# Patient Record
Sex: FEMALE | Race: ASIAN | NOT HISPANIC OR LATINO | URBAN - METROPOLITAN AREA
[De-identification: names, ages, dates, MRNs, and addresses within clinical notes are randomized per-mention and may not be internally consistent; named-entity substitution may affect disease eponyms.]

---

## 2022-01-02 ENCOUNTER — INPATIENT (INPATIENT)
Facility: HOSPITAL | Age: 87
LOS: 1 days | Discharge: HOME | DRG: 177 | End: 2022-01-04
Attending: STUDENT IN AN ORGANIZED HEALTH CARE EDUCATION/TRAINING PROGRAM | Admitting: HOSPITALIST
Payer: COMMERCIAL

## 2022-01-02 VITALS
HEART RATE: 80 BPM | OXYGEN SATURATION: 99 % | RESPIRATION RATE: 20 BRPM | DIASTOLIC BLOOD PRESSURE: 83 MMHG | HEIGHT: 60 IN | TEMPERATURE: 101 F | SYSTOLIC BLOOD PRESSURE: 161 MMHG | WEIGHT: 130.07 LBS

## 2022-01-02 DIAGNOSIS — J96.01 ACUTE RESPIRATORY FAILURE WITH HYPOXIA: ICD-10-CM

## 2022-01-02 DIAGNOSIS — G93.49 OTHER ENCEPHALOPATHY: ICD-10-CM

## 2022-01-02 DIAGNOSIS — Z29.9 ENCOUNTER FOR PROPHYLACTIC MEASURES, UNSPECIFIED: ICD-10-CM

## 2022-01-02 DIAGNOSIS — U07.1 COVID-19: ICD-10-CM

## 2022-01-02 DIAGNOSIS — F03.90 UNSPECIFIED DEMENTIA, UNSPECIFIED SEVERITY, WITHOUT BEHAVIORAL DISTURBANCE, PSYCHOTIC DISTURBANCE, MOOD DISTURBANCE, AND ANXIETY: ICD-10-CM

## 2022-01-02 DIAGNOSIS — I10 ESSENTIAL (PRIMARY) HYPERTENSION: ICD-10-CM

## 2022-01-02 LAB
ALBUMIN SERPL ELPH-MCNC: 4.1 G/DL — SIGNIFICANT CHANGE UP (ref 3.3–5)
ALBUMIN SERPL ELPH-MCNC: 4.1 G/DL — SIGNIFICANT CHANGE UP (ref 3.3–5)
ALP SERPL-CCNC: 55 U/L — SIGNIFICANT CHANGE UP (ref 40–120)
ALP SERPL-CCNC: 57 U/L — SIGNIFICANT CHANGE UP (ref 40–120)
ALT FLD-CCNC: 52 U/L — HIGH (ref 10–45)
ALT FLD-CCNC: 53 U/L — HIGH (ref 10–45)
ANION GAP SERPL CALC-SCNC: 12 MMOL/L — SIGNIFICANT CHANGE UP (ref 5–17)
APPEARANCE UR: CLEAR — SIGNIFICANT CHANGE UP
AST SERPL-CCNC: 89 U/L — HIGH (ref 10–40)
AST SERPL-CCNC: 89 U/L — HIGH (ref 10–40)
BACTERIA # UR AUTO: NEGATIVE — SIGNIFICANT CHANGE UP
BASE EXCESS BLDV CALC-SCNC: 0.8 MMOL/L — SIGNIFICANT CHANGE UP (ref -2–2)
BASOPHILS # BLD AUTO: 0 K/UL — SIGNIFICANT CHANGE UP (ref 0–0.2)
BASOPHILS NFR BLD AUTO: 0 % — SIGNIFICANT CHANGE UP (ref 0–2)
BILIRUB DIRECT SERPL-MCNC: 0.1 MG/DL — SIGNIFICANT CHANGE UP (ref 0–0.3)
BILIRUB INDIRECT FLD-MCNC: 0.2 MG/DL — SIGNIFICANT CHANGE UP (ref 0.2–1)
BILIRUB SERPL-MCNC: 0.3 MG/DL — SIGNIFICANT CHANGE UP (ref 0.2–1.2)
BILIRUB SERPL-MCNC: 0.3 MG/DL — SIGNIFICANT CHANGE UP (ref 0.2–1.2)
BILIRUB UR-MCNC: NEGATIVE — SIGNIFICANT CHANGE UP
BUN SERPL-MCNC: 12 MG/DL — SIGNIFICANT CHANGE UP (ref 7–23)
CA-I SERPL-SCNC: 1.14 MMOL/L — LOW (ref 1.15–1.33)
CALCIUM SERPL-MCNC: 8.1 MG/DL — LOW (ref 8.4–10.5)
CHLORIDE BLDV-SCNC: 101 MMOL/L — SIGNIFICANT CHANGE UP (ref 96–108)
CHLORIDE SERPL-SCNC: 101 MMOL/L — SIGNIFICANT CHANGE UP (ref 96–108)
CO2 BLDV-SCNC: 27 MMOL/L — HIGH (ref 22–26)
CO2 SERPL-SCNC: 21 MMOL/L — LOW (ref 22–31)
COLOR SPEC: YELLOW — SIGNIFICANT CHANGE UP
CREAT SERPL-MCNC: 0.97 MG/DL — SIGNIFICANT CHANGE UP (ref 0.5–1.3)
CREAT SERPL-MCNC: 1 MG/DL — SIGNIFICANT CHANGE UP (ref 0.5–1.3)
CRP SERPL-MCNC: 3 MG/L — SIGNIFICANT CHANGE UP (ref 0–4)
D DIMER BLD IA.RAPID-MCNC: 172 NG/ML DDU — SIGNIFICANT CHANGE UP
D DIMER BLD IA.RAPID-MCNC: 210 NG/ML DDU — SIGNIFICANT CHANGE UP
DIFF PNL FLD: NEGATIVE — SIGNIFICANT CHANGE UP
EOSINOPHIL # BLD AUTO: 0 K/UL — SIGNIFICANT CHANGE UP (ref 0–0.5)
EOSINOPHIL NFR BLD AUTO: 0 % — SIGNIFICANT CHANGE UP (ref 0–6)
EPI CELLS # UR: 5 /HPF — SIGNIFICANT CHANGE UP
FERRITIN SERPL-MCNC: 1106 NG/ML — HIGH (ref 15–150)
GAS PNL BLDV: 133 MMOL/L — LOW (ref 136–145)
GAS PNL BLDV: SIGNIFICANT CHANGE UP
GAS PNL BLDV: SIGNIFICANT CHANGE UP
GLUCOSE BLDV-MCNC: 114 MG/DL — HIGH (ref 70–99)
GLUCOSE SERPL-MCNC: 112 MG/DL — HIGH (ref 70–99)
GLUCOSE UR QL: NEGATIVE — SIGNIFICANT CHANGE UP
HCO3 BLDV-SCNC: 26 MMOL/L — SIGNIFICANT CHANGE UP (ref 22–29)
HCT VFR BLD CALC: 35.4 % — SIGNIFICANT CHANGE UP (ref 34.5–45)
HCT VFR BLDA CALC: 37 % — SIGNIFICANT CHANGE UP (ref 34.5–46.5)
HGB BLD CALC-MCNC: 12.4 G/DL — SIGNIFICANT CHANGE UP (ref 11.7–16.1)
HGB BLD-MCNC: 11.9 G/DL — SIGNIFICANT CHANGE UP (ref 11.5–15.5)
HOROWITZ INDEX BLDV+IHG-RTO: SIGNIFICANT CHANGE UP
HYALINE CASTS # UR AUTO: 18 /LPF — HIGH (ref 0–2)
INR BLD: 0.97 RATIO — SIGNIFICANT CHANGE UP (ref 0.88–1.16)
KETONES UR-MCNC: NEGATIVE — SIGNIFICANT CHANGE UP
LACTATE BLDV-MCNC: 1.2 MMOL/L — SIGNIFICANT CHANGE UP (ref 0.7–2)
LEUKOCYTE ESTERASE UR-ACNC: NEGATIVE — SIGNIFICANT CHANGE UP
LYMPHOCYTES # BLD AUTO: 0.34 K/UL — LOW (ref 1–3.3)
LYMPHOCYTES # BLD AUTO: 10.3 % — LOW (ref 13–44)
MANUAL SMEAR VERIFICATION: SIGNIFICANT CHANGE UP
MCHC RBC-ENTMCNC: 30.5 PG — SIGNIFICANT CHANGE UP (ref 27–34)
MCHC RBC-ENTMCNC: 33.6 GM/DL — SIGNIFICANT CHANGE UP (ref 32–36)
MCV RBC AUTO: 90.8 FL — SIGNIFICANT CHANGE UP (ref 80–100)
MONOCYTES # BLD AUTO: 0.37 K/UL — SIGNIFICANT CHANGE UP (ref 0–0.9)
MONOCYTES NFR BLD AUTO: 11.1 % — SIGNIFICANT CHANGE UP (ref 2–14)
NEUTROPHILS # BLD AUTO: 2.61 K/UL — SIGNIFICANT CHANGE UP (ref 1.8–7.4)
NEUTROPHILS NFR BLD AUTO: 70.1 % — SIGNIFICANT CHANGE UP (ref 43–77)
NEUTS BAND # BLD: 8.5 % — HIGH (ref 0–8)
NITRITE UR-MCNC: NEGATIVE — SIGNIFICANT CHANGE UP
PCO2 BLDV: 43 MMHG — HIGH (ref 39–42)
PH BLDV: 7.39 — SIGNIFICANT CHANGE UP (ref 7.32–7.43)
PH UR: 6 — SIGNIFICANT CHANGE UP (ref 5–8)
PLAT MORPH BLD: NORMAL — SIGNIFICANT CHANGE UP
PLATELET # BLD AUTO: 101 K/UL — LOW (ref 150–400)
PO2 BLDV: 31 MMHG — SIGNIFICANT CHANGE UP (ref 25–45)
POTASSIUM BLDV-SCNC: 3.8 MMOL/L — SIGNIFICANT CHANGE UP (ref 3.5–5.1)
POTASSIUM SERPL-MCNC: 3.9 MMOL/L — SIGNIFICANT CHANGE UP (ref 3.5–5.3)
POTASSIUM SERPL-SCNC: 3.9 MMOL/L — SIGNIFICANT CHANGE UP (ref 3.5–5.3)
PROCALCITONIN SERPL-MCNC: 0.1 NG/ML — SIGNIFICANT CHANGE UP (ref 0.02–0.1)
PROT SERPL-MCNC: 6.5 G/DL — SIGNIFICANT CHANGE UP (ref 6–8.3)
PROT SERPL-MCNC: 6.8 G/DL — SIGNIFICANT CHANGE UP (ref 6–8.3)
PROT UR-MCNC: ABNORMAL
PROTHROM AB SERPL-ACNC: 11.7 SEC — SIGNIFICANT CHANGE UP (ref 10.6–13.6)
RBC # BLD: 3.9 M/UL — SIGNIFICANT CHANGE UP (ref 3.8–5.2)
RBC # FLD: 12.6 % — SIGNIFICANT CHANGE UP (ref 10.3–14.5)
RBC BLD AUTO: NORMAL — SIGNIFICANT CHANGE UP
RBC CASTS # UR COMP ASSIST: 1 /HPF — SIGNIFICANT CHANGE UP (ref 0–4)
SAO2 % BLDV: 52.2 % — LOW (ref 67–88)
SARS-COV-2 RNA SPEC QL NAA+PROBE: DETECTED
SMUDGE CELLS # BLD: PRESENT — SIGNIFICANT CHANGE UP
SODIUM SERPL-SCNC: 134 MMOL/L — LOW (ref 135–145)
SP GR SPEC: 1.03 — HIGH (ref 1.01–1.02)
UROBILINOGEN FLD QL: NEGATIVE — SIGNIFICANT CHANGE UP
WBC # BLD: 3.32 K/UL — LOW (ref 3.8–10.5)
WBC # FLD AUTO: 3.32 K/UL — LOW (ref 3.8–10.5)
WBC UR QL: 2 /HPF — SIGNIFICANT CHANGE UP (ref 0–5)

## 2022-01-02 PROCEDURE — 70450 CT HEAD/BRAIN W/O DYE: CPT | Mod: 26

## 2022-01-02 PROCEDURE — 71045 X-RAY EXAM CHEST 1 VIEW: CPT | Mod: 26

## 2022-01-02 PROCEDURE — 93010 ELECTROCARDIOGRAM REPORT: CPT

## 2022-01-02 PROCEDURE — 99223 1ST HOSP IP/OBS HIGH 75: CPT

## 2022-01-02 PROCEDURE — 99285 EMERGENCY DEPT VISIT HI MDM: CPT | Mod: 25

## 2022-01-02 RX ORDER — GUAIFENESIN/DEXTROMETHORPHAN 600MG-30MG
10 TABLET, EXTENDED RELEASE 12 HR ORAL EVERY 4 HOURS
Refills: 0 | Status: DISCONTINUED | OUTPATIENT
Start: 2022-01-02 | End: 2022-01-04

## 2022-01-02 RX ORDER — REMDESIVIR 5 MG/ML
100 INJECTION INTRAVENOUS EVERY 24 HOURS
Refills: 0 | Status: DISCONTINUED | OUTPATIENT
Start: 2022-01-03 | End: 2022-01-03

## 2022-01-02 RX ORDER — DEXAMETHASONE 0.5 MG/5ML
6 ELIXIR ORAL DAILY
Refills: 0 | Status: DISCONTINUED | OUTPATIENT
Start: 2022-01-02 | End: 2022-01-03

## 2022-01-02 RX ORDER — DONEPEZIL HYDROCHLORIDE 10 MG/1
5 TABLET, FILM COATED ORAL AT BEDTIME
Refills: 0 | Status: DISCONTINUED | OUTPATIENT
Start: 2022-01-02 | End: 2022-01-04

## 2022-01-02 RX ORDER — AMLODIPINE BESYLATE 2.5 MG/1
1 TABLET ORAL
Qty: 0 | Refills: 0 | DISCHARGE

## 2022-01-02 RX ORDER — REMDESIVIR 5 MG/ML
INJECTION INTRAVENOUS
Refills: 0 | Status: DISCONTINUED | OUTPATIENT
Start: 2022-01-02 | End: 2022-01-03

## 2022-01-02 RX ORDER — REMDESIVIR 5 MG/ML
200 INJECTION INTRAVENOUS EVERY 24 HOURS
Refills: 0 | Status: COMPLETED | OUTPATIENT
Start: 2022-01-02 | End: 2022-01-02

## 2022-01-02 RX ORDER — METOPROLOL TARTRATE 50 MG
25 TABLET ORAL DAILY
Refills: 0 | Status: DISCONTINUED | OUTPATIENT
Start: 2022-01-02 | End: 2022-01-04

## 2022-01-02 RX ORDER — ENOXAPARIN SODIUM 100 MG/ML
40 INJECTION SUBCUTANEOUS DAILY
Refills: 0 | Status: DISCONTINUED | OUTPATIENT
Start: 2022-01-02 | End: 2022-01-04

## 2022-01-02 RX ORDER — TRAZODONE HCL 50 MG
1 TABLET ORAL
Qty: 0 | Refills: 0 | DISCHARGE

## 2022-01-02 RX ORDER — AMLODIPINE BESYLATE 2.5 MG/1
2.5 TABLET ORAL DAILY
Refills: 0 | Status: DISCONTINUED | OUTPATIENT
Start: 2022-01-02 | End: 2022-01-04

## 2022-01-02 RX ORDER — TRAZODONE HCL 50 MG
100 TABLET ORAL AT BEDTIME
Refills: 0 | Status: DISCONTINUED | OUTPATIENT
Start: 2022-01-02 | End: 2022-01-04

## 2022-01-02 RX ORDER — DONEPEZIL HYDROCHLORIDE 10 MG/1
1 TABLET, FILM COATED ORAL
Qty: 0 | Refills: 0 | DISCHARGE

## 2022-01-02 RX ORDER — METOPROLOL TARTRATE 50 MG
1 TABLET ORAL
Qty: 0 | Refills: 0 | DISCHARGE

## 2022-01-02 RX ORDER — SODIUM CHLORIDE 9 MG/ML
500 INJECTION INTRAMUSCULAR; INTRAVENOUS; SUBCUTANEOUS ONCE
Refills: 0 | Status: COMPLETED | OUTPATIENT
Start: 2022-01-02 | End: 2022-01-02

## 2022-01-02 RX ORDER — ACETAMINOPHEN 500 MG
650 TABLET ORAL EVERY 4 HOURS
Refills: 0 | Status: DISCONTINUED | OUTPATIENT
Start: 2022-01-02 | End: 2022-01-04

## 2022-01-02 RX ORDER — DEXAMETHASONE 0.5 MG/5ML
6 ELIXIR ORAL ONCE
Refills: 0 | Status: COMPLETED | OUTPATIENT
Start: 2022-01-02 | End: 2022-01-02

## 2022-01-02 RX ADMIN — REMDESIVIR 500 MILLIGRAM(S): 5 INJECTION INTRAVENOUS at 10:58

## 2022-01-02 RX ADMIN — Medication 6 MILLIGRAM(S): at 03:04

## 2022-01-02 RX ADMIN — ENOXAPARIN SODIUM 40 MILLIGRAM(S): 100 INJECTION SUBCUTANEOUS at 12:07

## 2022-01-02 RX ADMIN — Medication 6 MILLIGRAM(S): at 08:46

## 2022-01-02 RX ADMIN — DONEPEZIL HYDROCHLORIDE 5 MILLIGRAM(S): 10 TABLET, FILM COATED ORAL at 22:53

## 2022-01-02 RX ADMIN — Medication 100 MILLIGRAM(S): at 22:53

## 2022-01-02 RX ADMIN — AMLODIPINE BESYLATE 2.5 MILLIGRAM(S): 2.5 TABLET ORAL at 08:46

## 2022-01-02 RX ADMIN — Medication 25 MILLIGRAM(S): at 08:46

## 2022-01-02 RX ADMIN — SODIUM CHLORIDE 500 MILLILITER(S): 9 INJECTION INTRAMUSCULAR; INTRAVENOUS; SUBCUTANEOUS at 05:06

## 2022-01-02 NOTE — H&P ADULT - HISTORY OF PRESENT ILLNESS
History obtained via [employer] Mand[employer]   #[id number], however, patient unable  to provide history, further history obtained via family  Patient is an 86 year old female w/pmh of HTN, recent covid infection , presents for worsening  confusion over the past few days.   Per family, patient has  intermittent fevers  tmax 102 ,occasional cough , for the past week. She tested positive for COVID on home rapid test  5 days prior to admission. During this time , patient has poor po intake . Patient was treated with Cold and flu medication with minimal improvement , she was recently started on Ivermectin.  On day of admission, patient was more confused and not answering questions appropriately prompting family to call EMS. Oxygen saturation taken by ems was noted to be in the low 90’s.

## 2022-01-02 NOTE — CHART NOTE - NSCHARTNOTEFT_GEN_A_CORE
Patient seen and examined at bedside.  Staff assisted with translation.  No acute complaints.  ros limited by language barrier.     T(C): 36.9 (01-02-22 @ 06:56), Max: 39.5 (01-02-22 @ 01:03)  T(F): 98.4 (01-02-22 @ 06:56), Max: 103.1 (01-02-22 @ 01:03)  HR: 66 (01-02-22 @ 06:56) (66 - 80)  BP: 129/62 (01-02-22 @ 06:56) (120/43 - 165/58)  ABP: --  ABP(mean): --  RR: 18 (01-02-22 @ 06:56) (18 - 22)  SpO2: 95% (01-02-22 @ 06:56) (92% - 100%)      CONSTITUTIONAL: No acute distress.   HEENT:  Conjunctiva clear B/L.  Moist oral mucosa.   Cardiovascular: RRR with no murmurs. No JVD noted. No lower extremity edema B/L. Extremities are warm and well perfused. Radial pulses 2+ B/L. Dorsalis pedis pulses 2+ B/L.    Respiratory: Lungs CTAB. No wrr. No accessory muscle use.   Gastrointestinal:  Soft, nontender. Non-distended. Non-rigid. No CVA tenderness B/L.  MSK:  No joint swelling. No joint erythema B/L. No midline spinal tenderness.  Neurologic:  Alert and awake. Moving all extremities. Following commands. Making eye contact. Brachial and Patellar reflexes 2/4 b/l. No clonus. Babinski down going.   Skin:  No rashes noted. No skin erythema noted.   Psych:  Normal affect. Normal Mood.     Labs and imaging reviewed.    Assessment	  86 F w/pmh of HTN, recent covid infection , p/w   confusion x few days       Problem/Plan - 1:  ·  Problem: Acute respiratory failure with hypoxia.   ·  Plan: 2/2 to covid   - continue supplemental o2.     Problem/Plan - 2:  ·  Problem: Other encephalopathy.   ·  Plan: presumed covid encephalopathy.  - CT head w/o acute process.  - UA, tsh and b12 ordered  - avoid exacerbating delirium  - optimize day/night cycle.     Problem/Plan - 3:  ·  Problem: 2019 novel coronavirus disease (COVID-19).   ·  Plan: -Continue Dex 6mg x 10 day course  - Remdesivir x 5 day course.   - monitor liver tests; if uptrends further, we may have to hold RDV.   - Airborne + contact Isolation   - Encouraging awake proning and lateral decubitus positioning as tolerated.   - Incentive spirometer and OOBTC as tolerated.  - no clear bacterial process; monitor off abx; monitor cbc w/ diff.      Problem/Plan - 4:  ·  Problem: Hypertension.   ·  Plan: - amlodipine   - metoprolol.     Problem/Plan - 5:  ·  Problem: Dementia.   ·  Plan:     - continue Donepezil   - continue Trazadone.     Problem/Plan - 6:  ·  Problem: DVT prophylaxis.   ·  Plan: - lmwh.

## 2022-01-02 NOTE — ED ADULT NURSE REASSESSMENT NOTE - NS ED NURSE REASSESS COMMENT FT1
RN attempted to call 5 clementine to give report twice. RN was on hold for approx 10 minutes the first time- and the second time no answer came. Pt is awaiting transport. RN will attempt report again once transport arrives.

## 2022-01-02 NOTE — PROGRESS NOTE ADULT - PROBLEM SELECTOR PLAN 3
-Continue Dexamethaone 6mg x 10 day course  - Remdesivir   - monitor liver tests   - Airborne + contact Isolation   - Guaifenesin/ dextromethorphan PRN   - Tylenol prn -Continue Dexamethaone 6mg x 10 day course  - Remdesivir ordered   - monitor liver tests   - Airborne + contact Isolation   - Guaifenesin/ dextromethorphan PRN   - Tylenol prn

## 2022-01-02 NOTE — ED PROVIDER NOTE - ATTENDING CONTRIBUTION TO CARE
Attending Statement (SANDY Carter MD):    HPI: 85 y/o F with h/o HTN, presenting with worsening SOB over past few days, in setting of prior diagnosis of COVID-19 6 days ago via home test.  Not vaccinated.  Per family, poor appetite / not eating/drinking much since diagnosis. Denies any Chest pain, abdominal pain, nausea/vomiting, diarrhea. +fever.  Arrives via EMS, with O2 sat ~90; improved to high 90s on 2L NC. No leg swelling. No h/o heart failure or renal disease.    Review of Systems:  -General: +fever  -ENT: +congestion, no difficulty swallowing  -Pulmonary: +cough, +shortness of breath  -Cardiac: no chest pain  -Gastrointestinal: no abdominal pain, no nausea, no vomiting, and no diarrhea.  -Genitourinary: no blood or pain with urination  -Musculoskeletal: no back or neck pain  -Skin: no rashes  -Endocrine: No h/o diabetes  -Neurologic: No new weakness or numbness in extremities    All else negative unless otherwise specified elsewhere in this note.    PSH/PMH as noted above    On Physical Exam:  General: well appearing, in NAD, speaking clearly in full sentences and without difficulty; cooperative with exam  HEENT: anicteric, airway patent  Neck: no JVD  Cardiac: s1s2  Lungs: CTABL, mildly tachypneic  Abdomen: soft nontender/nondistended  : no bladder tenderness or distension  Skin: intact, no rash  Extremities: no peripheral edema, no gross deformities    MDM: Mild hypoxia, which is likely COVID 19 infection; starting covid labs/work-up, including cbc, cmp, ferritin, ldh, esr, crp, d dimer and vbg, and will obtain CXR, continue on supplemental oxygen as needed, start on decadron, and plan for admission.

## 2022-01-02 NOTE — H&P ADULT - NSHPLABSRESULTS_GEN_ALL_CORE
Labs personally reviewed:                          11.9   3.32  )-----------( 101      ( 02 Jan 2022 02:34 )             35.4     01-02    134<L>  |  101  |  12  ----------------------------<  112<H>  3.9   |  21<L>  |  1.00    Ca    8.1<L>      02 Jan 2022 02:34    TPro  6.8  /  Alb  4.1  /  TBili  0.3  /  DBili  x   /  AST  89<H>  /  ALT  52<H>  /  AlkPhos  57  01-02        LIVER FUNCTIONS - ( 02 Jan 2022 02:34 )  Alb: 4.1 g/dL / Pro: 6.8 g/dL / ALK PHOS: 57 U/L / ALT: 52 U/L / AST: 89 U/L / GGT: x               CAPILLARY BLOOD GLUCOSE          Imaging:  CXR personally reviewed: no focal opacity    EKG personally reviewed: nsr, no acute st changes

## 2022-01-02 NOTE — PATIENT PROFILE ADULT - FALL HARM RISK - HARM RISK INTERVENTIONS
Assistance OOB with selected safe patient handling equipment/Communicate Risk of Fall with Harm to all staff/Discuss with provider need for PT consult/Monitor gait and stability/Provide patient with walking aids - walker, cane, crutches/Reinforce activity limits and safety measures with patient and family/Tailored Fall Risk Interventions/Use of alarms - bed, chair and/or voice tab/Visual Cue: Yellow wristband and red socks/Bed in lowest position, wheels locked, appropriate side rails in place/Call bell, personal items and telephone in reach/Instruct patient to call for assistance before getting out of bed or chair/Non-slip footwear when patient is out of bed/[place] to call system/Physically safe environment - no spills, clutter or unnecessary equipment/Purposeful Proactive Rounding/Room/bathroom lighting operational, light cord in reach

## 2022-01-02 NOTE — PROGRESS NOTE ADULT - SUBJECTIVE AND OBJECTIVE BOX
PROGRESS NOTE:   Authored by Neva Wiley MD   Patient is a 86y old  Female who presents with a chief complaint of hypoxia in setting of covid (02 Jan 2022 04:55)      SUBJECTIVE / OVERNIGHT EVENTS:    ADDITIONAL REVIEW OF SYSTEMS:  CONSTITUTIONAL: No fever, weight loss, or fatigue  EYES: No eye pain, visual disturbances, or discharge  ENMT:  No difficulty hearing, tinnitus, vertigo; No sinus or throat pain  NECK: No pain or stiffness  BREASTS: No pain, masses, or nipple discharge  RESPIRATORY: No cough, wheezing, chills or hemoptysis; No shortness of breath  CARDIOVASCULAR: No chest pain, palpitations, dizziness, or leg swelling  GASTROINTESTINAL: No abdominal or epigastric pain. No nausea, vomiting, or hematemesis; No diarrhea or constipation. No melena or hematochezia.  GENITOURINARY: No dysuria, frequency, hematuria, or incontinence  NEUROLOGICAL: No headaches, memory loss, loss of strength, numbness, or tremors  SKIN: No itching, burning, rashes, or lesions   LYMPH NODES: No enlarged glands  ENDOCRINE: No heat or cold intolerance; No hair loss  MUSCULOSKELETAL: No joint pain or swelling; No muscle, back, or extremity pain  PSYCHIATRIC: No depression, anxiety, mood swings, or difficulty sleeping  HEME/LYMPH: No easy bruising, or bleeding gums  ALLERY AND IMMUNOLOGIC: No hives or eczema    MEDICATIONS  (STANDING):  amLODIPine   Tablet 2.5 milliGRAM(s) Oral daily  dexAMETHasone  Injectable 6 milliGRAM(s) IV Push daily  donepezil 5 milliGRAM(s) Oral at bedtime  enoxaparin Injectable 40 milliGRAM(s) SubCutaneous daily  metoprolol succinate ER 25 milliGRAM(s) Oral daily  remdesivir  IVPB   IV Intermittent   traZODone 100 milliGRAM(s) Oral at bedtime    MEDICATIONS  (PRN):  acetaminophen     Tablet .. 650 milliGRAM(s) Oral every 4 hours PRN Temp greater or equal to 38.5C (101.3F)  guaifenesin/dextromethorphan Oral Liquid 10 milliLiter(s) Oral every 4 hours PRN Cough      CAPILLARY BLOOD GLUCOSE        I&O's Summary      PHYSICAL EXAM:  Vital Signs Last 24 Hrs  T(C): 36.6 (02 Jan 2022 13:00), Max: 39.5 (02 Jan 2022 01:03)  T(F): 97.8 (02 Jan 2022 13:00), Max: 103.1 (02 Jan 2022 01:03)  HR: 63 (02 Jan 2022 13:00) (63 - 80)  BP: 146/67 (02 Jan 2022 13:00) (120/43 - 165/58)  BP(mean): 66 (02 Jan 2022 05:00) (66 - 90)  RR: 18 (02 Jan 2022 13:00) (18 - 22)  SpO2: 99% (02 Jan 2022 13:00) (92% - 100%)    GENERAL: No acute distress, well-developed  HEAD:  Atraumatic, Normocephalic  EYES: EOMI, PERRLA, conjunctiva and sclera clear  NECK: Supple, no lymphadenopathy, no JVD  CHEST/LUNG: CTAB; No wheezes, rales, or rhonchi  HEART: Regular rate and rhythm; No murmurs, rubs, or gallops  ABDOMEN: Soft, non-tender, non-distended; normal bowel sounds, no organomegaly  EXTREMITIES:  2+ peripheral pulses b/l, No clubbing, cyanosis, or edema  NEUROLOGY: A&O x 3, no focal deficits  SKIN: No rashes or lesions    LABS:                        11.9   3.32  )-----------( 101      ( 02 Jan 2022 02:34 )             35.4     01-02    x   |  x   |  x   ----------------------------<  x   x    |  x   |  0.97    Ca    8.1<L>      02 Jan 2022 02:34    TPro  6.5  /  Alb  4.1  /  TBili  0.3  /  DBili  0.1  /  AST  89<H>  /  ALT  53<H>  /  AlkPhos  55  01-02    PT/INR - ( 02 Jan 2022 05:22 )   PT: 11.7 sec;   INR: 0.97 ratio                     RADIOLOGY & ADDITIONAL TESTS:  Results Reviewed:   Imaging Personally Reviewed:  Electrocardiogram Personally Reviewed:    COORDINATION OF CARE:  Care Discussed with Consultants/Other Providers [Y/N]:  Prior or Outpatient Records Reviewed [Y/N]:   PROGRESS NOTE:   Authored by Neva Wiley MD   Patient is a 86y old  Female who presents with a chief complaint of hypoxia in setting of covid (02 Jan 2022 04:55)      SUBJECTIVE / OVERNIGHT EVENTS: Pt doing okay. On nasal cannula, 4ml. alert and oriented x 3. however, answer to questions inappropriately.     ADDITIONAL REVIEW OF SYSTEMS:  CONSTITUTIONAL: No fever, weight loss, or fatigue  EYES: No eye pain, visual disturbances, or discharge  ENMT:  No difficulty hearing, tinnitus, vertigo; No sinus or throat pain  NECK: No pain or stiffness  BREASTS: No pain, masses, or nipple discharge  RESPIRATORY: No cough, wheezing, chills or hemoptysis; No shortness of breath  CARDIOVASCULAR: No chest pain, palpitations, dizziness, or leg swelling  GASTROINTESTINAL: No abdominal or epigastric pain. No nausea, vomiting, or hematemesis; No diarrhea or constipation. No melena or hematochezia.  GENITOURINARY: No dysuria, frequency, hematuria, or incontinence  NEUROLOGICAL: No headaches, memory loss, loss of strength, numbness, or tremors  SKIN: No itching, burning, rashes, or lesions   LYMPH NODES: No enlarged glands  ENDOCRINE: No heat or cold intolerance; No hair loss  MUSCULOSKELETAL: No joint pain or swelling; No muscle, back, or extremity pain  PSYCHIATRIC: No depression, anxiety, mood swings, or difficulty sleeping  HEME/LYMPH: No easy bruising, or bleeding gums  ALLERY AND IMMUNOLOGIC: No hives or eczema    MEDICATIONS  (STANDING):  amLODIPine   Tablet 2.5 milliGRAM(s) Oral daily  dexAMETHasone  Injectable 6 milliGRAM(s) IV Push daily  donepezil 5 milliGRAM(s) Oral at bedtime  enoxaparin Injectable 40 milliGRAM(s) SubCutaneous daily  metoprolol succinate ER 25 milliGRAM(s) Oral daily  remdesivir  IVPB   IV Intermittent   traZODone 100 milliGRAM(s) Oral at bedtime    MEDICATIONS  (PRN):  acetaminophen     Tablet .. 650 milliGRAM(s) Oral every 4 hours PRN Temp greater or equal to 38.5C (101.3F)  guaifenesin/dextromethorphan Oral Liquid 10 milliLiter(s) Oral every 4 hours PRN Cough      CAPILLARY BLOOD GLUCOSE        I&O's Summary      PHYSICAL EXAM:  Vital Signs Last 24 Hrs  T(C): 36.6 (02 Jan 2022 13:00), Max: 39.5 (02 Jan 2022 01:03)  T(F): 97.8 (02 Jan 2022 13:00), Max: 103.1 (02 Jan 2022 01:03)  HR: 63 (02 Jan 2022 13:00) (63 - 80)  BP: 146/67 (02 Jan 2022 13:00) (120/43 - 165/58)  BP(mean): 66 (02 Jan 2022 05:00) (66 - 90)  RR: 18 (02 Jan 2022 13:00) (18 - 22)  SpO2: 99% (02 Jan 2022 13:00) (92% - 100%)    GENERAL: No acute distress, well-developed  HEAD:  Atraumatic, Normocephalic  EYES: EOMI, conjunctiva and sclera clear  NECK: Supple, no lymphadenopathy, no JVD  CHEST/LUNG: clear to ascultation b/l. no wheezing, rales or rhonchi.   HEART: Regular rate and rhythm; No murmurs, rubs, or gallops  ABDOMEN: Soft, non-tender, non-distended; normal bowel sounds, no organomegaly  EXTREMITIES:  2+ peripheral pulses b/l, No clubbing, cyanosis, or edema  NEUROLOGY: A&O x 3, no focal deficits      LABS:                        11.9   3.32  )-----------( 101      ( 02 Jan 2022 02:34 )             35.4     01-02    x   |  x   |  x   ----------------------------<  x   x    |  x   |  0.97    Ca    8.1<L>      02 Jan 2022 02:34    TPro  6.5  /  Alb  4.1  /  TBili  0.3  /  DBili  0.1  /  AST  89<H>  /  ALT  53<H>  /  AlkPhos  55  01-02    PT/INR - ( 02 Jan 2022 05:22 )   PT: 11.7 sec;   INR: 0.97 ratio                     RADIOLOGY & ADDITIONAL TESTS:  Results Reviewed:   Imaging Personally Reviewed:  Electrocardiogram Personally Reviewed:    COORDINATION OF CARE:  Care Discussed with Consultants/Other Providers [Y/N]:  Prior or Outpatient Records Reviewed [Y/N]:

## 2022-01-02 NOTE — PROGRESS NOTE ADULT - PROBLEM SELECTOR PLAN 2
no focal deficits , appears confused  , possibly from hypoxia vs. infection vs. dehydration   - normal saline bolus 500 cc x 1   - CT head no focal deficits , alert and oriented x 3 but have moments where responsing inappropriately - unclear if poor hearing, language barrier or confusion  possibly from hypoxia vs. infection vs. dehydration   - normal saline bolus 500 cc x 1   - head CT head with some mild chronic microvascular changes without evidence of an acute transcortical infarction or hemorrhage.  - ordered TSH, B12 and folate in AM

## 2022-01-02 NOTE — H&P ADULT - NSHPPHYSICALEXAM_GEN_ALL_CORE
Vital Signs Last 24 Hrs  T(C): 37.4 (02 Jan 2022 05:00), Max: 39.5 (02 Jan 2022 01:03)  T(F): 99.3 (02 Jan 2022 05:00), Max: 103.1 (02 Jan 2022 01:03)  HR: 71 (02 Jan 2022 05:00) (71 - 80)  BP: 120/43 (02 Jan 2022 05:00) (120/43 - 165/58)  BP(mean): 66 (02 Jan 2022 05:00) (66 - 90)  RR: 20 (02 Jan 2022 05:00) (20 - 22)  SpO2: 95% (02 Jan 2022 05:00) (92% - 100%)    GENERAL: No acute distress, well-developed, confused , hard of hearing , responds inappropriately   HEAD:  Atraumatic, Normocephalic  ENT: EOMI, PERRLA, conjunctiva and sclera clear,  moist mucosa no pharyngeal erythema or exudates   NECK: supple , no JVD   CHEST/LUNG: Clear to auscultation bilaterally; No wheeze, equal breath sounds bilaterally   BACK: No spinal tenderness,  No CVA tenderness   HEART: Regular rate and rhythm; No murmurs, rubs, or gallops  ABDOMEN: Soft, Nontender, Nondistended; Bowel sounds present  EXTREMITIES:  No clubbing, cyanosis, or edema  MSK: No joint swelling or effusions, ROM intact   PSYCH: Normal behavior/affect  NEUROLOGY: AAOx1-2, non-focal, cranial nerves intact  SKIN: Normal color, No rashes or lesions

## 2022-01-02 NOTE — ED ADULT NURSE NOTE - OBJECTIVE STATEMENT
Pt is an 86 yr old female with pmh of HTN coming from home for increased covid symptoms. Pt is unvaccinated and has had covid for about a week now- pt has had fever on and off, cough, and slight sob. Pt today, according to the grandson, has become more lethargic and slightly ams. Pt is a/ox 2-3- vitals showing low oxygen on 2L- patient tachypneic and slight audible wheezing. Pt feels cold and weak but is able to move around In the bed independently.

## 2022-01-02 NOTE — H&P ADULT - PROBLEM SELECTOR PLAN 2
no focal deficits , appears confused  , possibly from hypoxia vs. infection vs. dehydration   - normal saline bolus 500 cc x 1   - CT head

## 2022-01-02 NOTE — H&P ADULT - PROBLEM SELECTOR PLAN 3
-Continue Dexamethaone 6mg x 10 day course  - Remdesivir   - monitor liver tests   - Airborne + contact Isolation   - Guaifenesin/ dextromethorphan PRN   - Tylenol prn

## 2022-01-02 NOTE — ED PROVIDER NOTE - OBJECTIVE STATEMENT
87 yo F with a pmhx of HTN presents to the ED with SOB. She was diagnosed with covid 6 days ago by home antigen test. She has not been vaccinated. Since her covid (+) test, patient has been progressively more SOB. As per grandson, patient has poor po intake. She denies any CP, abdominal pain, N/V/D. She admits to fevers at home. She denies any other symptoms at bedside. EMS noted a low pulse ox in the low 90s and started her on 2L NC.

## 2022-01-02 NOTE — ED PROVIDER NOTE - CLINICAL SUMMARY MEDICAL DECISION MAKING FREE TEXT BOX
85 yo F with a pmhx of HTN presents to the ED with SOB. She was diagnosed with covid 6 days ago by home antigen test. She has not been vaccinated. Since her covid (+) test, patient has been progressively more SOB. vitals notable for SPo2 in the low 90s with amb sat. PE as noted above. no acute respiratory distress. concerns for severe covid infection. will order, labs, imaging, ekg, meds, reassess

## 2022-01-02 NOTE — H&P ADULT - PROBLEM SELECTOR PLAN 5
per son no memory issues , however , on [employer] patient prescribed Donepezil   - continue Donepezil   - continue Trazadone

## 2022-01-03 LAB
A1C WITH ESTIMATED AVERAGE GLUCOSE RESULT: 6 % — HIGH (ref 4–5.6)
ALBUMIN SERPL ELPH-MCNC: 3.6 G/DL — SIGNIFICANT CHANGE UP (ref 3.3–5)
ALP SERPL-CCNC: 46 U/L — SIGNIFICANT CHANGE UP (ref 40–120)
ALT FLD-CCNC: 61 U/L — HIGH (ref 10–45)
ANION GAP SERPL CALC-SCNC: 13 MMOL/L — SIGNIFICANT CHANGE UP (ref 5–17)
APTT BLD: 33.3 SEC — SIGNIFICANT CHANGE UP (ref 27.5–35.5)
AST SERPL-CCNC: 85 U/L — HIGH (ref 10–40)
BASOPHILS # BLD AUTO: 0 K/UL — SIGNIFICANT CHANGE UP (ref 0–0.2)
BASOPHILS NFR BLD AUTO: 0 % — SIGNIFICANT CHANGE UP (ref 0–2)
BILIRUB SERPL-MCNC: 0.3 MG/DL — SIGNIFICANT CHANGE UP (ref 0.2–1.2)
BUN SERPL-MCNC: 19 MG/DL — SIGNIFICANT CHANGE UP (ref 7–23)
CALCIUM SERPL-MCNC: 8 MG/DL — LOW (ref 8.4–10.5)
CHLORIDE SERPL-SCNC: 105 MMOL/L — SIGNIFICANT CHANGE UP (ref 96–108)
CK SERPL-CCNC: 147 U/L — SIGNIFICANT CHANGE UP (ref 25–170)
CO2 SERPL-SCNC: 19 MMOL/L — LOW (ref 22–31)
CREAT SERPL-MCNC: 0.88 MG/DL — SIGNIFICANT CHANGE UP (ref 0.5–1.3)
CRP SERPL-MCNC: <3 MG/L — SIGNIFICANT CHANGE UP (ref 0–4)
CULTURE RESULTS: NO GROWTH — SIGNIFICANT CHANGE UP
D DIMER BLD IA.RAPID-MCNC: <150 NG/ML DDU — SIGNIFICANT CHANGE UP
EOSINOPHIL # BLD AUTO: 0 K/UL — SIGNIFICANT CHANGE UP (ref 0–0.5)
EOSINOPHIL NFR BLD AUTO: 0 % — SIGNIFICANT CHANGE UP (ref 0–6)
ESTIMATED AVERAGE GLUCOSE: 126 MG/DL — HIGH (ref 68–114)
FERRITIN SERPL-MCNC: 1512 NG/ML — HIGH (ref 15–150)
FOLATE SERPL-MCNC: >20 NG/ML — SIGNIFICANT CHANGE UP
GLUCOSE SERPL-MCNC: 128 MG/DL — HIGH (ref 70–99)
HCT VFR BLD CALC: 33.7 % — LOW (ref 34.5–45)
HGB BLD-MCNC: 11.5 G/DL — SIGNIFICANT CHANGE UP (ref 11.5–15.5)
INR BLD: 0.9 RATIO — SIGNIFICANT CHANGE UP (ref 0.88–1.16)
LDH SERPL L TO P-CCNC: 285 U/L — HIGH (ref 50–242)
LYMPHOCYTES # BLD AUTO: 0.98 K/UL — LOW (ref 1–3.3)
LYMPHOCYTES # BLD AUTO: 39.8 % — SIGNIFICANT CHANGE UP (ref 13–44)
MAGNESIUM SERPL-MCNC: 2.3 MG/DL — SIGNIFICANT CHANGE UP (ref 1.6–2.6)
MCHC RBC-ENTMCNC: 30.7 PG — SIGNIFICANT CHANGE UP (ref 27–34)
MCHC RBC-ENTMCNC: 34.1 GM/DL — SIGNIFICANT CHANGE UP (ref 32–36)
MCV RBC AUTO: 89.9 FL — SIGNIFICANT CHANGE UP (ref 80–100)
MONOCYTES # BLD AUTO: 0.36 K/UL — SIGNIFICANT CHANGE UP (ref 0–0.9)
MONOCYTES NFR BLD AUTO: 14.6 % — HIGH (ref 2–14)
NEUTROPHILS # BLD AUTO: 1.12 K/UL — LOW (ref 1.8–7.4)
NEUTROPHILS NFR BLD AUTO: 45.6 % — SIGNIFICANT CHANGE UP (ref 43–77)
NRBC # BLD: 0 /100 WBCS — SIGNIFICANT CHANGE UP (ref 0–0)
PHOSPHATE SERPL-MCNC: 3.1 MG/DL — SIGNIFICANT CHANGE UP (ref 2.5–4.5)
PLATELET # BLD AUTO: 101 K/UL — LOW (ref 150–400)
POTASSIUM SERPL-MCNC: 3.7 MMOL/L — SIGNIFICANT CHANGE UP (ref 3.5–5.3)
POTASSIUM SERPL-SCNC: 3.7 MMOL/L — SIGNIFICANT CHANGE UP (ref 3.5–5.3)
PROCALCITONIN SERPL-MCNC: 0.12 NG/ML — HIGH (ref 0.02–0.1)
PROT SERPL-MCNC: 6.1 G/DL — SIGNIFICANT CHANGE UP (ref 6–8.3)
PROTHROM AB SERPL-ACNC: 10.9 SEC — SIGNIFICANT CHANGE UP (ref 10.6–13.6)
RBC # BLD: 3.75 M/UL — LOW (ref 3.8–5.2)
RBC # FLD: 12.6 % — SIGNIFICANT CHANGE UP (ref 10.3–14.5)
SODIUM SERPL-SCNC: 137 MMOL/L — SIGNIFICANT CHANGE UP (ref 135–145)
SPECIMEN SOURCE: SIGNIFICANT CHANGE UP
TSH SERPL-MCNC: 0.71 UIU/ML — SIGNIFICANT CHANGE UP (ref 0.27–4.2)
VIT B12 SERPL-MCNC: 1915 PG/ML — HIGH (ref 232–1245)
WBC # BLD: 2.46 K/UL — LOW (ref 3.8–10.5)
WBC # FLD AUTO: 2.46 K/UL — LOW (ref 3.8–10.5)

## 2022-01-03 PROCEDURE — 99232 SBSQ HOSP IP/OBS MODERATE 35: CPT | Mod: GC

## 2022-01-03 RX ADMIN — AMLODIPINE BESYLATE 2.5 MILLIGRAM(S): 2.5 TABLET ORAL at 06:26

## 2022-01-03 RX ADMIN — Medication 6 MILLIGRAM(S): at 06:25

## 2022-01-03 RX ADMIN — ENOXAPARIN SODIUM 40 MILLIGRAM(S): 100 INJECTION SUBCUTANEOUS at 10:34

## 2022-01-03 RX ADMIN — Medication 25 MILLIGRAM(S): at 06:26

## 2022-01-03 RX ADMIN — Medication 100 MILLIGRAM(S): at 22:38

## 2022-01-03 RX ADMIN — Medication 10 MILLILITER(S): at 22:38

## 2022-01-03 RX ADMIN — REMDESIVIR 500 MILLIGRAM(S): 5 INJECTION INTRAVENOUS at 10:36

## 2022-01-03 RX ADMIN — DONEPEZIL HYDROCHLORIDE 5 MILLIGRAM(S): 10 TABLET, FILM COATED ORAL at 22:38

## 2022-01-03 NOTE — PROGRESS NOTE ADULT - PROBLEM SELECTOR PLAN 1
2/2 to covid   - continue supplemental o2 2/2 to covid   - pt improving now off NC 2L and on room air   - s/p dexamethasone and remdesivir  -  ambulation SpO2, sitting 92%, walking 95%

## 2022-01-03 NOTE — DISCHARGE NOTE PROVIDER - HOSPITAL COURSE
History obtained via language solutions Mand[employer]   #[id number], however, patient unable  to provide history, further history obtained via family  Patient is an 86 year old female w/ PMH of HTN, recent covid infection , presents for worsening  confusion over the past few days.   Per family, patient has  intermittent fevers  Tmax 102 ,occasional cough , for the past week. She tested positive for COVID on home rapid test  5 days prior to admission. During this time , patient has poor po intake . Patient was treated with Cold and flu medication with minimal improvement , she was recently started on Ivermectin.  On day of admission, patient was more confused and not answering questions appropriately prompting family to call EMS. Oxygen saturation taken by ems was noted to be in the low 90’s.     In ED:     BP: 161/83 ; HR: 80 ; RR: 20 ; T: 100.8 F ; SpO2: 99%  Placed on NC 2L, given 500 cc NS and 1 x dexamethasone     General Floor:  Pt was seen and received alert and oriented x 3. Inflammatory markers were drawn showing elevated Ferritin 1512, Procal 0.12, . Pt also had elevated transaminase AST 85 and ALT 61 which had been stable during hospitalization. Given elevated ferritin and on NC, pt was given remdesivir  and continued on dexamethasone. Pt also had further work up for confusion. Head CT negative for intracranial hemorrhage. UA was negative for UTI. TSH, Folate and B12 all within normal limits. Pt on 1/03 was on room air and ambulatory SpO2 was assuring (rest 92%, walking 95%) so discontinued off of dexamethasone and remdesivir. Pt received 2 doses of both medication. Pt was seen by PT who recommended ______. History obtained via language [employer] Mandarin   #[id number], however, patient unable  to provide history, further history obtained via family  Patient is an 86 year old female w/ PMH of HTN, recent covid infection , presents for worsening  confusion over the past few days.   Per family, patient has  intermittent fevers  Tmax 102 ,occasional cough , for the past week. She tested positive for COVID on home rapid test  5 days prior to admission. During this time , patient has poor po intake . Patient was treated with Cold and flu medication with minimal improvement , she was recently started on Ivermectin.  On day of admission, patient was more confused and not answering questions appropriately prompting family to call EMS. Oxygen saturation taken by ems was noted to be in the low 90’s.     In ED:     BP: 161/83 ; HR: 80 ; RR: 20 ; T: 100.8 F ; SpO2: 99%  Placed on NC 2L, given 500 cc NS and 1 x dexamethasone     General Floor:  Pt was seen and received alert and oriented x 3. Chest X-ray showed clear lungs. Inflammatory markers were drawn showing elevated Ferritin 1512, Procal 0.12, . Pt also had elevated transaminase AST 85 and ALT 61 which had been stable during hospitalization. Pt was given remdesivir x 2and dexamethasone x3. Pt also had further work up for confusion. Head CT negative for intracranial hemorrhage. UA was negative for UTI. TSH, Folate and B12 all within normal limits. Pt on 1/03 was on room air and ambulatory SpO2 was assuring (rest 92%, walking 95%) so discontinued off of dexamethasone and remdesivir. Pt was seen by PT who recommended ______. History obtained via language [employer] Mandarin   #[id number], however, patient unable  to provide history, further history obtained via family  Patient is an 86 year old female w/ PMH of HTN, recent covid infection , presents for worsening  confusion over the past few days.   Per family, patient has  intermittent fevers  Tmax 102 ,occasional cough , for the past week. She tested positive for COVID on home rapid test  5 days prior to admission. During this time , patient has poor po intake . Patient was treated with Cold and flu medication with minimal improvement , she was recently started on Ivermectin.  On day of admission, patient was more confused and not answering questions appropriately prompting family to call EMS. Oxygen saturation taken by ems was noted to be in the low 90’s.     In ED:     BP: 161/83 ; HR: 80 ; RR: 20 ; T: 100.8 F ; SpO2: 99%  Placed on NC 2L, given 500 cc NS and 1 x dexamethasone     General Floor:  Pt was seen and received alert and oriented x 3. Chest X-ray showed clear lungs. Inflammatory markers were drawn showing elevated Ferritin 1512, Procal 0.12, . Pt also had elevated transaminase AST 85 and ALT 61 which had been stable during hospitalization. Pt was given remdesivir x 2and dexamethasone x3. Pt also had further work up for confusion. Head CT negative for intracranial hemorrhage. UA was negative for UTI. TSH, Folate and B12 all within normal limits. Pt on 1/03 was on room air and ambulatory SpO2 was assuring (rest 92%, walking 95%) so discontinued off of dexamethasone and remdesivir. Pt was seen by PT who recommended to continue using straight cane. She does not require skilled PT and has assistance from grandson at home. History obtained via language [employer]arin   #[id number], however, patient unable  to provide history, further history obtained via family  Patient is an 86 year old female w/ PMH of HTN, recent covid infection , presents for worsening  confusion over the past few days.   Per family, patient has  intermittent fevers  Tmax 102 ,occasional cough , for the past week. She tested positive for COVID on home rapid test  5 days prior to admission. During this time , patient has poor po intake . Patient was treated with Cold and flu medication with minimal improvement , she was recently started on Ivermectin.  On day of admission, patient was more confused and not answering questions appropriately prompting family to call EMS. Oxygen saturation taken by ems was noted to be in the low 90’s.     In ED:     BP: 161/83 ; HR: 80 ; RR: 20 ; T: 100.8 F ; SpO2: 99%  Placed on NC 2L, given 500 cc NS and 1 x dexamethasone     General Floor:  Pt was seen and received alert and oriented x 3. Chest X-ray showed clear lungs. Inflammatory markers were drawn showing elevated Ferritin 1512, Procal 0.12, . Pt also had elevated transaminase AST 85 and ALT 61 which had been stable during hospitalization. Pt was given remdesivir x 2and dexamethasone x3. Pt also had further work up for confusion. Head CT negative for intracranial hemorrhage. UA was negative for UTI. TSH, Folate and B12 all within normal limits. Pt on 1/03 was on room air and ambulatory SpO2 was assuring (rest 92%, walking 95%) so discontinued off of dexamethasone and remdesivir. Pt was seen by PT who stated patient does not require skilled PT needs.    Patient hemodynamically stable and prepared for discharge.

## 2022-01-03 NOTE — DISCHARGE NOTE PROVIDER - NSDCMRMEDTOKEN_GEN_ALL_CORE_FT
amLODIPine 2.5 mg oral tablet: 1 tab(s) orally once a day  donepezil 5 mg oral tablet: 1 tab(s) orally once a day (at bedtime)  Metoprolol Succinate ER 25 mg oral tablet, extended release: 1 tab(s) orally once a day  traZODone 100 mg oral tablet: 1 tab(s) orally once a day (at bedtime)

## 2022-01-03 NOTE — PROGRESS NOTE ADULT - PROBLEM SELECTOR PLAN 2
no focal deficits , alert and oriented x 3 but have moments where responsing inappropriately - unclear if poor hearing, language barrier or confusion  possibly from hypoxia vs. infection vs. dehydration   - normal saline bolus 500 cc x 1   - head CT head with some mild chronic microvascular changes without evidence of an acute transcortical infarction or hemorrhage.  - ordered TSH, B12 and folate in AM no focal deficits , alert and oriented x 3 but have moments where responsing inappropriately - unclear if poor hearing, language barrier or confusion  possibly from hypoxia vs. infection vs. dehydration   - normal saline bolus 500 cc x 1, pt appears euvolemic   - head CT head with some mild chronic microvascular changes without evidence of an acute transcortical infarction or hemorrhage.  - TSH, B12 and folate WNL

## 2022-01-03 NOTE — PROGRESS NOTE ADULT - ASSESSMENT
86 F w/pmh of HTN, recent covid infection , p/w   confusion x few days   86 F w/pmh of HTN, recent covid infection , p/w   confusion and fever. Pt currently on room air, with no more episodes of fever.

## 2022-01-03 NOTE — DISCHARGE NOTE PROVIDER - NSDCCPCAREPLAN_GEN_ALL_CORE_FT
PRINCIPAL DISCHARGE DIAGNOSIS  Diagnosis: Acute hypoxemic respiratory failure due to COVID-19  Assessment and Plan of Treatment: COVID-19 is the disease caused by coronavirus. Coronaviruses generally cause upper respiratory (nose, throat, and lung) infections, such as a cold. The virus spreads quickly and easily. You were admitted to the hospital because you were having some confusion most likely due to the fevers from the infection. This virus can sometimes cause issues with your lungs, making it difficult to breath. You were initially on oxygen but have been doing well breathing on your own. You received 2 medications, dexamathsone and remdesivir, which help decrease the inflammation in your lung caused by the virus. Since you have been feeling better and not requiring help with breathing, these medications were discontinued. At home, if you develop any fever or pain,  take Tylenol as needed.   DISCHARGE INSTRUCTIONS:  Please contact your primary care physician if you are experiencing:  • trouble breathing or shortness of breath at rest.  • chest pain or pressure that lasts longer than 5 minutes.  • confusion or unable to be awaken  • blue lips   Seek care immediately if:   •You have a fever of 104°F (40°C) or higher.         PRINCIPAL DISCHARGE DIAGNOSIS  Diagnosis: Acute hypoxemic respiratory failure due to COVID-19  Assessment and Plan of Treatment: COVID-19 is the disease caused by coronavirus. Coronaviruses generally cause upper respiratory (nose, throat, and lung) infections, such as a cold. The virus spreads quickly and easily. You were admitted to the hospital because you were having some confusion most likely due to the fevers from the infection. This virus can sometimes cause issues with your lungs, making it difficult to breath. You were initially on oxygen but have been doing well breathing on your own. You received 2 medications, dexamathsone and remdesivir, which help decrease the inflammation in your lung caused by the virus. Since you have been feeling better and not requiring help with breathing, these medications were discontinued. At home, if you develop any fever or pain,  take Tylenol as needed. If you have device to check your oxygen level, please keep eye on oxygen at home.   Once discharged, please quarantine for 10 days from positive COVID test. You may get COVID PCR outpatient to test yourself after quarantine.   DISCHARGE INSTRUCTIONS:  Please contact your primary care physician if you are experiencing:  • prolong difficulty breathing or shortness of breath at rest.  • prolong chest pain or pressure that lasts longer than 5 minutes.  • confusion or unable to be awaken  • blue lips   Seek care immediately if:   •You have a fever of 104°F (40°C) or higher.         PRINCIPAL DISCHARGE DIAGNOSIS  Diagnosis: Acute hypoxemic respiratory failure due to COVID-19  Assessment and Plan of Treatment: COVID-19 is the disease caused by coronavirus. Coronaviruses generally cause upper respiratory (nose, throat, and lung) infections, such as a cold. The virus spreads quickly and easily. You were admitted to the hospital because you were having some confusion most likely due to the fevers from the infection. This virus can sometimes cause issues with your lungs, making it difficult to breath. You were initially on oxygen but have been doing well breathing on your own. You received 2 medications, dexamathsone and remdesivir, which help decrease the inflammation in your lung caused by the virus. Since you have been feeling better and not requiring help with breathing, these medications were discontinued. At home, if you develop any fever or pain,  take Tylenol as needed. If you have device to check your oxygen level, please keep eye on oxygen at home. You were also seen by physical therapy and they advised to continue using your straight cane to ambulate.   Once discharged, please quarantine for 10 days from positive COVID test. You may get COVID PCR outpatient to test yourself after quarantine. Please see your primary care physician in 1-2 weeks to ensure you are improving.   DISCHARGE INSTRUCTIONS:  Please contact your primary care physician if you are experiencing:  • prolong difficulty breathing or shortness of breath at rest.  • prolong chest pain or pressure that lasts longer than 5 minutes.  • confusion or unable to be awaken  • blue lips   Seek care immediately if:   •You have a fever of 104°F (40°C) or higher.         PRINCIPAL DISCHARGE DIAGNOSIS  Diagnosis: Acute hypoxemic respiratory failure due to COVID-19  Assessment and Plan of Treatment: COVID-19 is the disease caused by coronavirus. Coronaviruses generally cause upper respiratory (nose, throat, and lung) infections, such as a cold. The virus spreads quickly and easily. You were admitted to the hospital because you were having some confusion most likely due to the fevers from the infection. This virus can sometimes cause issues with your lungs, making it difficult to breath. You were initially on oxygen but have been doing well breathing on your own. You received 2 medications, dexamathsone and remdesivir, which help decrease the inflammation in your lung caused by the virus. Since you have been feeling better and not requiring help with breathing, these medications were discontinued. At home, if you develop any fever or pain,  take Tylenol as needed. If you have device to check your oxygen level, please keep eye on oxygen at home. You were also seen by physical therapy and they advised to continue using your straight cane to ambulate.   Once discharged, please quarantine for 10 days from positive COVID test. You may get COVID PCR outpatient to test yourself after quarantine. Please see your primary care physician in 1-2 weeks to ensure you are improving. Once you've completed your quarantine, you recommend you can get vaccinated against COVID-19 infection with either the Pfizer or Moderna vaccines.   DISCHARGE INSTRUCTIONS:  Seek care immediately if:   - prolong difficulty breathing or shortness of breath at rest.  - prolong chest pain or pressure that lasts longer than 5 minutes.  - confusion or unable to be awaken  - blue lips   - fever above 104F

## 2022-01-03 NOTE — PROGRESS NOTE ADULT - SUBJECTIVE AND OBJECTIVE BOX
PROGRESS NOTE:   Authored by Neva Wiley MD   Patient is a 86y old  Female who presents with a chief complaint of hypoxia in setting of covid (2022 14:08)      SUBJECTIVE / OVERNIGHT EVENTS:    ADDITIONAL REVIEW OF SYSTEMS:  CONSTITUTIONAL: No fever, weight loss, or fatigue  EYES: No eye pain, visual disturbances, or discharge  ENMT:  No difficulty hearing, tinnitus, vertigo; No sinus or throat pain  NECK: No pain or stiffness  BREASTS: No pain, masses, or nipple discharge  RESPIRATORY: No cough, wheezing, chills or hemoptysis; No shortness of breath  CARDIOVASCULAR: No chest pain, palpitations, dizziness, or leg swelling  GASTROINTESTINAL: No abdominal or epigastric pain. No nausea, vomiting, or hematemesis; No diarrhea or constipation. No melena or hematochezia.  GENITOURINARY: No dysuria, frequency, hematuria, or incontinence  NEUROLOGICAL: No headaches, memory loss, loss of strength, numbness, or tremors  SKIN: No itching, burning, rashes, or lesions   LYMPH NODES: No enlarged glands  ENDOCRINE: No heat or cold intolerance; No hair loss  MUSCULOSKELETAL: No joint pain or swelling; No muscle, back, or extremity pain  PSYCHIATRIC: No depression, anxiety, mood swings, or difficulty sleeping  HEME/LYMPH: No easy bruising, or bleeding gums  ALLERY AND IMMUNOLOGIC: No hives or eczema    MEDICATIONS  (STANDING):  amLODIPine   Tablet 2.5 milliGRAM(s) Oral daily  dexAMETHasone  Injectable 6 milliGRAM(s) IV Push daily  donepezil 5 milliGRAM(s) Oral at bedtime  enoxaparin Injectable 40 milliGRAM(s) SubCutaneous daily  metoprolol succinate ER 25 milliGRAM(s) Oral daily  remdesivir  IVPB   IV Intermittent   remdesivir  IVPB 100 milliGRAM(s) IV Intermittent every 24 hours  traZODone 100 milliGRAM(s) Oral at bedtime    MEDICATIONS  (PRN):  acetaminophen     Tablet .. 650 milliGRAM(s) Oral every 4 hours PRN Temp greater or equal to 38.5C (101.3F)  guaifenesin/dextromethorphan Oral Liquid 10 milliLiter(s) Oral every 4 hours PRN Cough      CAPILLARY BLOOD GLUCOSE        I&O's Summary      PHYSICAL EXAM:  Vital Signs Last 24 Hrs  T(C): 36.8 (2022 04:05), Max: 36.8 (2022 21:24)  T(F): 98.2 (2022 04:05), Max: 98.2 (2022 21:24)  HR: 56 (2022 04:05) (56 - 66)  BP: 144/67 (2022 04:05) (142/60 - 146/67)  BP(mean): --  RR: 18 (2022 04:05) (18 - 18)  SpO2: 97% (2022 04:05) (97% - 99%)    GENERAL: No acute distress, well-developed  HEAD:  Atraumatic, Normocephalic  EYES: EOMI, PERRLA, conjunctiva and sclera clear  NECK: Supple, no lymphadenopathy, no JVD  CHEST/LUNG: CTAB; No wheezes, rales, or rhonchi  HEART: Regular rate and rhythm; No murmurs, rubs, or gallops  ABDOMEN: Soft, non-tender, non-distended; normal bowel sounds, no organomegaly  EXTREMITIES:  2+ peripheral pulses b/l, No clubbing, cyanosis, or edema  NEUROLOGY: A&O x 3, no focal deficits  SKIN: No rashes or lesions    LABS:                        11.5   2.46  )-----------( 101      ( 2022 06:24 )             33.7     01-03    137  |  105  |  19  ----------------------------<  128<H>  3.7   |  19<L>  |  0.88    Ca    8.0<L>      2022 06:21  Phos  3.1     01-03  Mg     2.3     01-03    TPro  6.1  /  Alb  3.6  /  TBili  0.3  /  DBili  x   /  AST  85<H>  /  ALT  61<H>  /  AlkPhos  46  01-03    PT/INR - ( 2022 06:29 )   PT: 10.9 sec;   INR: 0.90 ratio         PTT - ( 2022 06:29 )  PTT:33.3 sec  CARDIAC MARKERS ( 2022 06:21 )  x     / x     / 147 U/L / x     / x          Urinalysis Basic - ( 2022 16:08 )    Color: Yellow / Appearance: Clear / S.027 / pH: x  Gluc: x / Ketone: Negative  / Bili: Negative / Urobili: Negative   Blood: x / Protein: 100 mg/dL / Nitrite: Negative   Leuk Esterase: Negative / RBC: 1 /hpf / WBC 2 /HPF   Sq Epi: x / Non Sq Epi: 5 /hpf / Bacteria: Negative          RADIOLOGY & ADDITIONAL TESTS:  Results Reviewed:   Imaging Personally Reviewed:  Electrocardiogram Personally Reviewed:    COORDINATION OF CARE:  Care Discussed with Consultants/Other Providers [Y/N]:  Prior or Outpatient Records Reviewed [Y/N]:   PROGRESS NOTE:   Authored by Neva Wiley MD   Patient is a 86y old  Female who presents with a chief complaint of hypoxia in setting of covid (2022 14:08)      SUBJECTIVE / OVERNIGHT EVENTS: No overnight events. Pt found asleep. No distress or increase WOB. On room air.     ADDITIONAL REVIEW OF SYSTEMS:  CONSTITUTIONAL: No fever, weight loss, or fatigue  EYES: No eye pain, visual disturbances, or discharge  ENMT:  No difficulty hearing, tinnitus, vertigo; No sinus or throat pain  NECK: No pain or stiffness  BREASTS: No pain, masses, or nipple discharge  RESPIRATORY: No cough, wheezing, chills or hemoptysis; No shortness of breath  CARDIOVASCULAR: No chest pain, palpitations, dizziness, or leg swelling  GASTROINTESTINAL: No abdominal or epigastric pain. No nausea, vomiting, or hematemesis; No diarrhea or constipation. No melena or hematochezia.  GENITOURINARY: No dysuria, frequency, hematuria, or incontinence  NEUROLOGICAL: No headaches, memory loss, loss of strength, numbness, or tremors  SKIN: No itching, burning, rashes, or lesions   LYMPH NODES: No enlarged glands  ENDOCRINE: No heat or cold intolerance; No hair loss  MUSCULOSKELETAL: No joint pain or swelling; No muscle, back, or extremity pain  PSYCHIATRIC: No depression, anxiety, mood swings, or difficulty sleeping  HEME/LYMPH: No easy bruising, or bleeding gums  ALLERY AND IMMUNOLOGIC: No hives or eczema    MEDICATIONS  (STANDING):  amLODIPine   Tablet 2.5 milliGRAM(s) Oral daily  dexAMETHasone  Injectable 6 milliGRAM(s) IV Push daily  donepezil 5 milliGRAM(s) Oral at bedtime  enoxaparin Injectable 40 milliGRAM(s) SubCutaneous daily  metoprolol succinate ER 25 milliGRAM(s) Oral daily  remdesivir  IVPB   IV Intermittent   remdesivir  IVPB 100 milliGRAM(s) IV Intermittent every 24 hours  traZODone 100 milliGRAM(s) Oral at bedtime    MEDICATIONS  (PRN):  acetaminophen     Tablet .. 650 milliGRAM(s) Oral every 4 hours PRN Temp greater or equal to 38.5C (101.3F)  guaifenesin/dextromethorphan Oral Liquid 10 milliLiter(s) Oral every 4 hours PRN Cough      CAPILLARY BLOOD GLUCOSE        I&O's Summary      PHYSICAL EXAM:  Vital Signs Last 24 Hrs  T(C): 36.8 (2022 04:05), Max: 36.8 (2022 21:24)  T(F): 98.2 (2022 04:05), Max: 98.2 (2022 21:24)  HR: 56 (2022 04:05) (56 - 66)  BP: 144/67 (2022 04:05) (142/60 - 146/67)  BP(mean): --  RR: 18 (2022 04:05) (18 - 18)  SpO2: 97% (2022 04:05) (97% - 99%)    GENERAL: No acute distress, well-developed  HEAD:  Atraumatic, Normocephalic  EYES: EOMI conjunctiva and sclera clear  CHEST/LUNG: CTAB; No wheezes, rales, or rhonchi  HEART: Regular rate and rhythm; No murmurs, rubs, or gallops  ABDOMEN: Soft, non-tender, non-distended; normal bowel sounds, no organomegaly  EXTREMITIES:  2+ peripheral pulses b/l, No clubbing, cyanosis, or edema        LABS:                        11.5   2.46  )-----------( 101      ( 2022 06:24 )             33.7     01-03    137  |  105  |  19  ----------------------------<  128<H>  3.7   |  19<L>  |  0.88    Ca    8.0<L>      2022 06:21  Phos  3.1     01-03  Mg     2.3     -03    TPro  6.1  /  Alb  3.6  /  TBili  0.3  /  DBili  x   /  AST  85<H>  /  ALT  61<H>  /  AlkPhos  46  01-03    PT/INR - ( 2022 06:29 )   PT: 10.9 sec;   INR: 0.90 ratio         PTT - ( 2022 06:29 )  PTT:33.3 sec  CARDIAC MARKERS ( 2022 06:21 )  x     / x     / 147 U/L / x     / x          Urinalysis Basic - ( 2022 16:08 )    Color: Yellow / Appearance: Clear / S.027 / pH: x  Gluc: x / Ketone: Negative  / Bili: Negative / Urobili: Negative   Blood: x / Protein: 100 mg/dL / Nitrite: Negative   Leuk Esterase: Negative / RBC: 1 /hpf / WBC 2 /HPF   Sq Epi: x / Non Sq Epi: 5 /hpf / Bacteria: Negative          RADIOLOGY & ADDITIONAL TESTS:  Results Reviewed:   Imaging Personally Reviewed:  Electrocardiogram Personally Reviewed:    COORDINATION OF CARE:  Care Discussed with Consultants/Other Providers [Y/N]:  Prior or Outpatient Records Reviewed [Y/N]:

## 2022-01-03 NOTE — PROGRESS NOTE ADULT - PROBLEM SELECTOR PLAN 5
per son no memory issues , however , on [employer] patient prescribed Donepezil   - continue Donepezil   - continue Trazadone - continue Donepezil   - continue Trazadone

## 2022-01-03 NOTE — DISCHARGE NOTE PROVIDER - CARE PROVIDER_API CALL
BEVERLEY DAY  Internal Medicine  [id number] [place] [facility] [address] [address]  Phone: [phone]  Fax: [phone]  Follow Up Time: 1 week

## 2022-01-03 NOTE — PROGRESS NOTE ADULT - PROBLEM SELECTOR PLAN 3
-Continue Dexamethaone 6mg x 10 day course  - Remdesivir ordered   - monitor liver tests   - Airborne + contact Isolation   - Guaifenesin/ dextromethorphan PRN   - Tylenol prn -discontinue Dexamethaone and Remdesivir given improvement in respiration   - Airborne + contact Isolation   - Guaifenesin/ dextromethorphan PRN   - Tylenol prn

## 2022-01-04 VITALS
RESPIRATION RATE: 18 BRPM | DIASTOLIC BLOOD PRESSURE: 66 MMHG | TEMPERATURE: 98 F | HEART RATE: 58 BPM | SYSTOLIC BLOOD PRESSURE: 130 MMHG | OXYGEN SATURATION: 95 %

## 2022-01-04 LAB
ALBUMIN SERPL ELPH-MCNC: 3.4 G/DL — SIGNIFICANT CHANGE UP (ref 3.3–5)
ALP SERPL-CCNC: 43 U/L — SIGNIFICANT CHANGE UP (ref 40–120)
ALT FLD-CCNC: 67 U/L — HIGH (ref 10–45)
ANION GAP SERPL CALC-SCNC: 12 MMOL/L — SIGNIFICANT CHANGE UP (ref 5–17)
AST SERPL-CCNC: 77 U/L — HIGH (ref 10–40)
BILIRUB SERPL-MCNC: 0.3 MG/DL — SIGNIFICANT CHANGE UP (ref 0.2–1.2)
BUN SERPL-MCNC: 25 MG/DL — HIGH (ref 7–23)
CALCIUM SERPL-MCNC: 7.7 MG/DL — LOW (ref 8.4–10.5)
CHLORIDE SERPL-SCNC: 103 MMOL/L — SIGNIFICANT CHANGE UP (ref 96–108)
CO2 SERPL-SCNC: 20 MMOL/L — LOW (ref 22–31)
CREAT SERPL-MCNC: 0.87 MG/DL — SIGNIFICANT CHANGE UP (ref 0.5–1.3)
GLUCOSE SERPL-MCNC: 137 MG/DL — HIGH (ref 70–99)
HCT VFR BLD CALC: 33.3 % — LOW (ref 34.5–45)
HGB BLD-MCNC: 11.3 G/DL — LOW (ref 11.5–15.5)
MAGNESIUM SERPL-MCNC: 2.3 MG/DL — SIGNIFICANT CHANGE UP (ref 1.6–2.6)
MCHC RBC-ENTMCNC: 30.8 PG — SIGNIFICANT CHANGE UP (ref 27–34)
MCHC RBC-ENTMCNC: 33.9 GM/DL — SIGNIFICANT CHANGE UP (ref 32–36)
MCV RBC AUTO: 90.7 FL — SIGNIFICANT CHANGE UP (ref 80–100)
NRBC # BLD: 0 /100 WBCS — SIGNIFICANT CHANGE UP (ref 0–0)
PHOSPHATE SERPL-MCNC: 2.7 MG/DL — SIGNIFICANT CHANGE UP (ref 2.5–4.5)
PLATELET # BLD AUTO: 117 K/UL — LOW (ref 150–400)
POTASSIUM SERPL-MCNC: 3.6 MMOL/L — SIGNIFICANT CHANGE UP (ref 3.5–5.3)
POTASSIUM SERPL-SCNC: 3.6 MMOL/L — SIGNIFICANT CHANGE UP (ref 3.5–5.3)
PROT SERPL-MCNC: 5.8 G/DL — LOW (ref 6–8.3)
RBC # BLD: 3.67 M/UL — LOW (ref 3.8–5.2)
RBC # FLD: 12.8 % — SIGNIFICANT CHANGE UP (ref 10.3–14.5)
SODIUM SERPL-SCNC: 135 MMOL/L — SIGNIFICANT CHANGE UP (ref 135–145)
WBC # BLD: 5.01 K/UL — SIGNIFICANT CHANGE UP (ref 3.8–10.5)
WBC # FLD AUTO: 5.01 K/UL — SIGNIFICANT CHANGE UP (ref 3.8–10.5)

## 2022-01-04 PROCEDURE — 86140 C-REACTIVE PROTEIN: CPT

## 2022-01-04 PROCEDURE — 85610 PROTHROMBIN TIME: CPT

## 2022-01-04 PROCEDURE — 80076 HEPATIC FUNCTION PANEL: CPT

## 2022-01-04 PROCEDURE — 84132 ASSAY OF SERUM POTASSIUM: CPT

## 2022-01-04 PROCEDURE — 85379 FIBRIN DEGRADATION QUANT: CPT

## 2022-01-04 PROCEDURE — 87040 BLOOD CULTURE FOR BACTERIA: CPT

## 2022-01-04 PROCEDURE — 93005 ELECTROCARDIOGRAM TRACING: CPT

## 2022-01-04 PROCEDURE — 87086 URINE CULTURE/COLONY COUNT: CPT

## 2022-01-04 PROCEDURE — 84443 ASSAY THYROID STIM HORMONE: CPT

## 2022-01-04 PROCEDURE — 85025 COMPLETE CBC W/AUTO DIFF WBC: CPT

## 2022-01-04 PROCEDURE — 71045 X-RAY EXAM CHEST 1 VIEW: CPT

## 2022-01-04 PROCEDURE — 82803 BLOOD GASES ANY COMBINATION: CPT

## 2022-01-04 PROCEDURE — 85027 COMPLETE CBC AUTOMATED: CPT

## 2022-01-04 PROCEDURE — 80053 COMPREHEN METABOLIC PANEL: CPT

## 2022-01-04 PROCEDURE — 36415 COLL VENOUS BLD VENIPUNCTURE: CPT

## 2022-01-04 PROCEDURE — 70450 CT HEAD/BRAIN W/O DYE: CPT

## 2022-01-04 PROCEDURE — 83735 ASSAY OF MAGNESIUM: CPT

## 2022-01-04 PROCEDURE — 82550 ASSAY OF CK (CPK): CPT

## 2022-01-04 PROCEDURE — 83036 HEMOGLOBIN GLYCOSYLATED A1C: CPT

## 2022-01-04 PROCEDURE — 82746 ASSAY OF FOLIC ACID SERUM: CPT

## 2022-01-04 PROCEDURE — 82565 ASSAY OF CREATININE: CPT

## 2022-01-04 PROCEDURE — 85730 THROMBOPLASTIN TIME PARTIAL: CPT

## 2022-01-04 PROCEDURE — 87635 SARS-COV-2 COVID-19 AMP PRB: CPT

## 2022-01-04 PROCEDURE — 82947 ASSAY GLUCOSE BLOOD QUANT: CPT

## 2022-01-04 PROCEDURE — 82607 VITAMIN B-12: CPT

## 2022-01-04 PROCEDURE — 96374 THER/PROPH/DIAG INJ IV PUSH: CPT

## 2022-01-04 PROCEDURE — 85018 HEMOGLOBIN: CPT

## 2022-01-04 PROCEDURE — 84100 ASSAY OF PHOSPHORUS: CPT

## 2022-01-04 PROCEDURE — 82728 ASSAY OF FERRITIN: CPT

## 2022-01-04 PROCEDURE — 85014 HEMATOCRIT: CPT

## 2022-01-04 PROCEDURE — 83605 ASSAY OF LACTIC ACID: CPT

## 2022-01-04 PROCEDURE — 83615 LACTATE (LD) (LDH) ENZYME: CPT

## 2022-01-04 PROCEDURE — 97161 PT EVAL LOW COMPLEX 20 MIN: CPT

## 2022-01-04 PROCEDURE — 99285 EMERGENCY DEPT VISIT HI MDM: CPT

## 2022-01-04 PROCEDURE — 82435 ASSAY OF BLOOD CHLORIDE: CPT

## 2022-01-04 PROCEDURE — 81001 URINALYSIS AUTO W/SCOPE: CPT

## 2022-01-04 PROCEDURE — 99239 HOSP IP/OBS DSCHRG MGMT >30: CPT | Mod: GC

## 2022-01-04 PROCEDURE — 84145 PROCALCITONIN (PCT): CPT

## 2022-01-04 PROCEDURE — 82330 ASSAY OF CALCIUM: CPT

## 2022-01-04 PROCEDURE — 84295 ASSAY OF SERUM SODIUM: CPT

## 2022-01-04 RX ADMIN — Medication 10 MILLILITER(S): at 06:20

## 2022-01-04 RX ADMIN — Medication 25 MILLIGRAM(S): at 06:20

## 2022-01-04 RX ADMIN — AMLODIPINE BESYLATE 2.5 MILLIGRAM(S): 2.5 TABLET ORAL at 06:20

## 2022-01-04 RX ADMIN — ENOXAPARIN SODIUM 40 MILLIGRAM(S): 100 INJECTION SUBCUTANEOUS at 10:56

## 2022-01-04 NOTE — PROGRESS NOTE ADULT - PROBLEM SELECTOR PLAN 1
2/2 to covid   - pt improving now off NC 2L and on room air   - s/p dexamethasone and remdesivir  -  ambulation SpO2, sitting 92%, walking 95%

## 2022-01-04 NOTE — PROGRESS NOTE ADULT - ASSESSMENT
86 F w/pmh of HTN, recent covid infection , p/w   confusion and fever. Pt currently on room air, with no more episodes of fever.

## 2022-01-04 NOTE — PROGRESS NOTE ADULT - PROBLEM SELECTOR PLAN 1
2/2 to covid   - pt improving now off NC 2L and on room air   - s/p dexamethasone and remdesivir  -  ambulation SpO2, sitting 92%, walking 95% 2/2 to covid   - pt improving now off NC 2L and on room air   - s/p dexamethasone and remdesivir  -  ambulation SpO2, sitting 92%, walking 95%  - pt stable and cleared for discharge

## 2022-01-04 NOTE — PHYSICAL THERAPY INITIAL EVALUATION ADULT - PERTINENT HX OF CURRENT PROBLEM, REHAB EVAL
Pt is a 86 F w/pmh of HTN, recent covid infection , p/w   confusion and fever. Pt currently on room air, with no more episodes of fever. CT head with some mild chronic microvascular changes without evidence of an acute transcortical infarction or hemorrhage.

## 2022-01-04 NOTE — DISCHARGE NOTE NURSING/CASE MANAGEMENT/SOCIAL WORK - PATIENT PORTAL LINK FT
You can access the FollowMyHealth Patient Portal offered by [facility] by registering at the following website: [email or web address]. By joining [employer]’s FollowMyHealth portal, you will also be able to view your health information using other applications (apps) compatible with our system.

## 2022-01-04 NOTE — PROGRESS NOTE ADULT - SUBJECTIVE AND OBJECTIVE BOX
PROGRESS NOTE:   Authored by Neva Wiley MD   Patient is a 86y old  Female who presents with a chief complaint of hypoxia in setting of covid (2022 19:24)      SUBJECTIVE / OVERNIGHT EVENTS:    ADDITIONAL REVIEW OF SYSTEMS:  CONSTITUTIONAL: No fever, weight loss, or fatigue  EYES: No eye pain, visual disturbances, or discharge  ENMT:  No difficulty hearing, tinnitus, vertigo; No sinus or throat pain  NECK: No pain or stiffness  BREASTS: No pain, masses, or nipple discharge  RESPIRATORY: No cough, wheezing, chills or hemoptysis; No shortness of breath  CARDIOVASCULAR: No chest pain, palpitations, dizziness, or leg swelling  GASTROINTESTINAL: No abdominal or epigastric pain. No nausea, vomiting, or hematemesis; No diarrhea or constipation. No melena or hematochezia.  GENITOURINARY: No dysuria, frequency, hematuria, or incontinence  NEUROLOGICAL: No headaches, memory loss, loss of strength, numbness, or tremors  SKIN: No itching, burning, rashes, or lesions   LYMPH NODES: No enlarged glands  ENDOCRINE: No heat or cold intolerance; No hair loss  MUSCULOSKELETAL: No joint pain or swelling; No muscle, back, or extremity pain  PSYCHIATRIC: No depression, anxiety, mood swings, or difficulty sleeping  HEME/LYMPH: No easy bruising, or bleeding gums  ALLERY AND IMMUNOLOGIC: No hives or eczema    MEDICATIONS  (STANDING):  amLODIPine   Tablet 2.5 milliGRAM(s) Oral daily  donepezil 5 milliGRAM(s) Oral at bedtime  enoxaparin Injectable 40 milliGRAM(s) SubCutaneous daily  metoprolol succinate ER 25 milliGRAM(s) Oral daily  traZODone 100 milliGRAM(s) Oral at bedtime    MEDICATIONS  (PRN):  acetaminophen     Tablet .. 650 milliGRAM(s) Oral every 4 hours PRN Temp greater or equal to 38.5C (101.3F)  guaifenesin/dextromethorphan Oral Liquid 10 milliLiter(s) Oral every 4 hours PRN Cough      CAPILLARY BLOOD GLUCOSE        I&O's Summary    2022 07:01  -  2022 07:00  --------------------------------------------------------  IN: 250 mL / OUT: 0 mL / NET: 250 mL        PHYSICAL EXAM:  Vital Signs Last 24 Hrs  T(C): 36.7 (2022 04:10), Max: 36.7 (2022 11:51)  T(F): 98 (2022 04:10), Max: 98.1 (2022 11:51)  HR: 60 (2022 04:10) (53 - 62)  BP: 122/64 (2022 04:10) (106/68 - 123/67)  BP(mean): --  RR: 18 (2022 04:10) (18 - 18)  SpO2: 96% (2022 04:10) (92% - 97%)    GENERAL: No acute distress, well-developed  HEAD:  Atraumatic, Normocephalic  EYES: EOMI, PERRLA, conjunctiva and sclera clear  NECK: Supple, no lymphadenopathy, no JVD  CHEST/LUNG: CTAB; No wheezes, rales, or rhonchi  HEART: Regular rate and rhythm; No murmurs, rubs, or gallops  ABDOMEN: Soft, non-tender, non-distended; normal bowel sounds, no organomegaly  EXTREMITIES:  2+ peripheral pulses b/l, No clubbing, cyanosis, or edema  NEUROLOGY: A&O x 3, no focal deficits  SKIN: No rashes or lesions    LABS:                        11.3   5.01  )-----------( 117      ( 2022 06:58 )             33.3     01-04    135  |  103  |  25<H>  ----------------------------<  137<H>  3.6   |  20<L>  |  0.87    Ca    7.7<L>      2022 06:57  Phos  2.7     01-04  Mg     2.3     01-04    TPro  5.8<L>  /  Alb  3.4  /  TBili  0.3  /  DBili  x   /  AST  77<H>  /  ALT  67<H>  /  AlkPhos  43  01-04    PT/INR - ( 2022 06:29 )   PT: 10.9 sec;   INR: 0.90 ratio         PTT - ( 2022 06:29 )  PTT:33.3 sec  CARDIAC MARKERS ( 2022 06:21 )  x     / x     / 147 U/L / x     / x          Urinalysis Basic - ( 2022 16:08 )    Color: Yellow / Appearance: Clear / S.027 / pH: x  Gluc: x / Ketone: Negative  / Bili: Negative / Urobili: Negative   Blood: x / Protein: 100 mg/dL / Nitrite: Negative   Leuk Esterase: Negative / RBC: 1 /hpf / WBC 2 /HPF   Sq Epi: x / Non Sq Epi: 5 /hpf / Bacteria: Negative        Culture - Urine (collected 2022 23:06)  Source: Clean Catch Clean Catch (Midstream)  Final Report (2022 18:45):    No growth    Culture - Blood (collected 2022 10:24)  Source: .Blood Blood-Peripheral  Preliminary Report (2022 11:02):    No growth to date.    Culture - Blood (collected 2022 10:24)  Source: .Blood Blood-Peripheral  Preliminary Report (2022 11:02):    No growth to date.        RADIOLOGY & ADDITIONAL TESTS:  Results Reviewed:   Imaging Personally Reviewed:  Electrocardiogram Personally Reviewed:    COORDINATION OF CARE:  Care Discussed with Consultants/Other Providers [Y/N]:  Prior or Outpatient Records Reviewed [Y/N]:   PROGRESS NOTE:   Authored by Neva Wiley MD   Patient is a 86y old  Female who presents with a chief complaint of hypoxia in setting of covid (2022 19:24)      SUBJECTIVE / OVERNIGHT EVENTS: Pt says she is doing well. She feels she is improving. Pt has been on RA without any difficulty breathing. She has been able to ambulate without difficulty as well.     ADDITIONAL REVIEW OF SYSTEMS:  CONSTITUTIONAL: No fever, weight loss, or fatigue  EYES: No eye pain, visual disturbances, or discharge  ENMT:  No difficulty hearing, tinnitus, vertigo; No sinus or throat pain  NECK: No pain or stiffness  BREASTS: No pain, masses, or nipple discharge  RESPIRATORY: No cough, wheezing, chills or hemoptysis; No shortness of breath  CARDIOVASCULAR: No chest pain, palpitations, dizziness, or leg swelling  GASTROINTESTINAL: No abdominal or epigastric pain. No nausea, vomiting, or hematemesis; No diarrhea or constipation. No melena or hematochezia.  GENITOURINARY: No dysuria, frequency, hematuria, or incontinence  NEUROLOGICAL: No headaches, memory loss, loss of strength, numbness, or tremors  SKIN: No itching, burning, rashes, or lesions   LYMPH NODES: No enlarged glands  ENDOCRINE: No heat or cold intolerance; No hair loss  MUSCULOSKELETAL: No joint pain or swelling; No muscle, back, or extremity pain  PSYCHIATRIC: No depression, anxiety, mood swings, or difficulty sleeping  HEME/LYMPH: No easy bruising, or bleeding gums  ALLERY AND IMMUNOLOGIC: No hives or eczema    MEDICATIONS  (STANDING):  amLODIPine   Tablet 2.5 milliGRAM(s) Oral daily  donepezil 5 milliGRAM(s) Oral at bedtime  enoxaparin Injectable 40 milliGRAM(s) SubCutaneous daily  metoprolol succinate ER 25 milliGRAM(s) Oral daily  traZODone 100 milliGRAM(s) Oral at bedtime    MEDICATIONS  (PRN):  acetaminophen     Tablet .. 650 milliGRAM(s) Oral every 4 hours PRN Temp greater or equal to 38.5C (101.3F)  guaifenesin/dextromethorphan Oral Liquid 10 milliLiter(s) Oral every 4 hours PRN Cough      CAPILLARY BLOOD GLUCOSE        I&O's Summary    2022 07:01  -  2022 07:00  --------------------------------------------------------  IN: 250 mL / OUT: 0 mL / NET: 250 mL        PHYSICAL EXAM:  Vital Signs Last 24 Hrs  T(C): 36.7 (2022 04:10), Max: 36.7 (2022 11:51)  T(F): 98 (2022 04:10), Max: 98.1 (2022 11:51)  HR: 60 (2022 04:10) (53 - 62)  BP: 122/64 (2022 04:10) (106/68 - 123/67)  BP(mean): --  RR: 18 (2022 04:10) (18 - 18)  SpO2: 96% (2022 04:10) (92% - 97%)    GENERAL: No acute distress, well-developed  HEAD:  Atraumatic, Normocephalic  EYES: EOMI, conjunctiva and sclera clear  CHEST/LUNG: CTAB; No wheezes, rales, or rhonchi  HEART: Regular rate and rhythm; No murmurs, rubs, or gallops  ABDOMEN: Soft, non-tender, non-distended; normal bowel sounds, no organomegaly  EXTREMITIES:  2+ peripheral pulses b/l, No clubbing, cyanosis, or edema  NEUROLOGY: A&O x 3, no focal deficits      LABS:                        11.3   5.01  )-----------( 117      ( 2022 06:58 )             33.3     01-04    135  |  103  |  25<H>  ----------------------------<  137<H>  3.6   |  20<L>  |  0.87    Ca    7.7<L>      2022 06:57  Phos  2.7     01-04  Mg     2.3     01-04    TPro  5.8<L>  /  Alb  3.4  /  TBili  0.3  /  DBili  x   /  AST  77<H>  /  ALT  67<H>  /  AlkPhos  43  01-04    PT/INR - ( 2022 06:29 )   PT: 10.9 sec;   INR: 0.90 ratio         PTT - ( 2022 06:29 )  PTT:33.3 sec  CARDIAC MARKERS ( 2022 06:21 )  x     / x     / 147 U/L / x     / x          Urinalysis Basic - ( 2022 16:08 )    Color: Yellow / Appearance: Clear / S.027 / pH: x  Gluc: x / Ketone: Negative  / Bili: Negative / Urobili: Negative   Blood: x / Protein: 100 mg/dL / Nitrite: Negative   Leuk Esterase: Negative / RBC: 1 /hpf / WBC 2 /HPF   Sq Epi: x / Non Sq Epi: 5 /hpf / Bacteria: Negative        Culture - Urine (collected 2022 23:06)  Source: Clean Catch Clean Catch (Midstream)  Final Report (2022 18:45):    No growth    Culture - Blood (collected 2022 10:24)  Source: .Blood Blood-Peripheral  Preliminary Report (2022 11:02):    No growth to date.    Culture - Blood (collected 2022 10:24)  Source: .Blood Blood-Peripheral  Preliminary Report (2022 11:02):    No growth to date.        RADIOLOGY & ADDITIONAL TESTS:  Results Reviewed:   Imaging Personally Reviewed:  Electrocardiogram Personally Reviewed:    COORDINATION OF CARE:  Care Discussed with Consultants/Other Providers [Y/N]:  Prior or Outpatient Records Reviewed [Y/N]:

## 2022-01-04 NOTE — PROGRESS NOTE ADULT - ATTENDING COMMENTS
86F w/pmh HTN p/w confusion, hypoxia, admitted for covid.    - today off supplemental O2 at rest - will check ambulatory saturation, if >92% then can stop remdesivir/decadron and discharge home.
86F w/pmh HTN p/w confusion, hypoxia, admitted for covid.    - confusion has resolved (occurred during fevers)  - no longer requiring supplemental O2 at rest and on ambulation  - recommended patient to get COVID vaccine after discharge    Plan to discharge home today. PT consult pending. 35 minutes spent coordinating discharge

## 2022-01-04 NOTE — PROGRESS NOTE ADULT - PROBLEM SELECTOR PLAN 3
-discontinue Dexamethaone and Remdesivir given improvement in respiration   - Airborne + contact Isolation   - Guaifenesin/ dextromethorphan PRN   - Tylenol prn

## 2022-01-04 NOTE — PROGRESS NOTE ADULT - PROBLEM SELECTOR PLAN 2
no focal deficits , alert and oriented x 3 but have moments where responsing inappropriately - unclear if poor hearing, language barrier or confusion  possibly from hypoxia vs. infection vs. dehydration   - normal saline bolus 500 cc x 1, pt appears euvolemic   - head CT head with some mild chronic microvascular changes without evidence of an acute transcortical infarction or hemorrhage.  - TSH, B12 and folate WNL

## 2022-01-04 NOTE — DISCHARGE NOTE NURSING/CASE MANAGEMENT/SOCIAL WORK - NSDCPEFALRISK_GEN_ALL_CORE
For information on Fall & Injury Prevention, visit: [email or web address] OR  [email or web address] OR  https://www.cdc.gov/steadi/patient.html

## 2022-01-04 NOTE — PROGRESS NOTE ADULT - REASON FOR ADMISSION
hypoxia in setting of covid

## 2022-01-04 NOTE — PHYSICAL THERAPY INITIAL EVALUATION ADULT - ADDITIONAL COMMENTS
As per pt, pt lives in an apartment w/ grandson andno steps to enter w/ UHR. PTA pt was independent w/ all mobility w/ sstraight cane at times and ADLs.
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
## 2022-01-04 NOTE — PROGRESS NOTE ADULT - SUBJECTIVE AND OBJECTIVE BOX
PROGRESS NOTE:   Authored by Neva Wiley MD   Patient is a 86y old  Female who presents with a chief complaint of hypoxia in setting of covid (2022 19:24)      SUBJECTIVE / OVERNIGHT EVENTS:    ADDITIONAL REVIEW OF SYSTEMS:  CONSTITUTIONAL: No fever, weight loss, or fatigue  EYES: No eye pain, visual disturbances, or discharge  ENMT:  No difficulty hearing, tinnitus, vertigo; No sinus or throat pain  NECK: No pain or stiffness  BREASTS: No pain, masses, or nipple discharge  RESPIRATORY: No cough, wheezing, chills or hemoptysis; No shortness of breath  CARDIOVASCULAR: No chest pain, palpitations, dizziness, or leg swelling  GASTROINTESTINAL: No abdominal or epigastric pain. No nausea, vomiting, or hematemesis; No diarrhea or constipation. No melena or hematochezia.  GENITOURINARY: No dysuria, frequency, hematuria, or incontinence  NEUROLOGICAL: No headaches, memory loss, loss of strength, numbness, or tremors  SKIN: No itching, burning, rashes, or lesions   LYMPH NODES: No enlarged glands  ENDOCRINE: No heat or cold intolerance; No hair loss  MUSCULOSKELETAL: No joint pain or swelling; No muscle, back, or extremity pain  PSYCHIATRIC: No depression, anxiety, mood swings, or difficulty sleeping  HEME/LYMPH: No easy bruising, or bleeding gums  ALLERY AND IMMUNOLOGIC: No hives or eczema    MEDICATIONS  (STANDING):  amLODIPine   Tablet 2.5 milliGRAM(s) Oral daily  donepezil 5 milliGRAM(s) Oral at bedtime  enoxaparin Injectable 40 milliGRAM(s) SubCutaneous daily  metoprolol succinate ER 25 milliGRAM(s) Oral daily  traZODone 100 milliGRAM(s) Oral at bedtime    MEDICATIONS  (PRN):  acetaminophen     Tablet .. 650 milliGRAM(s) Oral every 4 hours PRN Temp greater or equal to 38.5C (101.3F)  guaifenesin/dextromethorphan Oral Liquid 10 milliLiter(s) Oral every 4 hours PRN Cough      CAPILLARY BLOOD GLUCOSE        I&O's Summary    2022 07:01  -  2022 07:00  --------------------------------------------------------  IN: 250 mL / OUT: 0 mL / NET: 250 mL        PHYSICAL EXAM:  Vital Signs Last 24 Hrs  T(C): 36.7 (2022 04:10), Max: 36.8 (2022 09:37)  T(F): 98 (2022 04:10), Max: 98.3 (2022 09:37)  HR: 60 (2022 04:10) (53 - 62)  BP: 122/64 (2022 04:10) (106/68 - 123/67)  BP(mean): --  RR: 18 (2022 04:10) (18 - 18)  SpO2: 96% (2022 04:10) (92% - 97%)    GENERAL: No acute distress, well-developed  HEAD:  Atraumatic, Normocephalic  EYES: EOMI, PERRLA, conjunctiva and sclera clear  NECK: Supple, no lymphadenopathy, no JVD  CHEST/LUNG: CTAB; No wheezes, rales, or rhonchi  HEART: Regular rate and rhythm; No murmurs, rubs, or gallops  ABDOMEN: Soft, non-tender, non-distended; normal bowel sounds, no organomegaly  EXTREMITIES:  2+ peripheral pulses b/l, No clubbing, cyanosis, or edema  NEUROLOGY: A&O x 3, no focal deficits  SKIN: No rashes or lesions    LABS:                        11.3   5.01  )-----------( 117      ( 2022 06:58 )             33.3     01-04    135  |  103  |  25<H>  ----------------------------<  137<H>  3.6   |  20<L>  |  0.87    Ca    7.7<L>      2022 06:57  Phos  2.7     01-04  Mg     2.3     01-04    TPro  5.8<L>  /  Alb  3.4  /  TBili  0.3  /  DBili  x   /  AST  77<H>  /  ALT  67<H>  /  AlkPhos  43  01-04    PT/INR - ( 2022 06:29 )   PT: 10.9 sec;   INR: 0.90 ratio         PTT - ( 2022 06:29 )  PTT:33.3 sec  CARDIAC MARKERS ( 2022 06:21 )  x     / x     / 147 U/L / x     / x          Urinalysis Basic - ( 2022 16:08 )    Color: Yellow / Appearance: Clear / S.027 / pH: x  Gluc: x / Ketone: Negative  / Bili: Negative / Urobili: Negative   Blood: x / Protein: 100 mg/dL / Nitrite: Negative   Leuk Esterase: Negative / RBC: 1 /hpf / WBC 2 /HPF   Sq Epi: x / Non Sq Epi: 5 /hpf / Bacteria: Negative        Culture - Urine (collected 2022 23:06)  Source: Clean Catch Clean Catch (Midstream)  Final Report (2022 18:45):    No growth    Culture - Blood (collected 2022 10:24)  Source: .Blood Blood-Peripheral  Preliminary Report (2022 11:02):    No growth to date.    Culture - Blood (collected 2022 10:24)  Source: .Blood Blood-Peripheral  Preliminary Report (2022 11:02):    No growth to date.        RADIOLOGY & ADDITIONAL TESTS:  Results Reviewed:   Imaging Personally Reviewed:  Electrocardiogram Personally Reviewed:    COORDINATION OF CARE:  Care Discussed with Consultants/Other Providers [Y/N]:  Prior or Outpatient Records Reviewed [Y/N]:

## 2022-01-07 LAB
CULTURE RESULTS: SIGNIFICANT CHANGE UP
CULTURE RESULTS: SIGNIFICANT CHANGE UP
SPECIMEN SOURCE: SIGNIFICANT CHANGE UP
SPECIMEN SOURCE: SIGNIFICANT CHANGE UP

## 2024-10-11 ENCOUNTER — EMERGENCY (EMERGENCY)
Facility: HOSPITAL | Age: 89
LOS: 1 days | Discharge: HOME | End: 2024-10-11
Attending: EMERGENCY MEDICINE
Payer: COMMERCIAL

## 2024-10-11 VITALS
HEART RATE: 88 BPM | DIASTOLIC BLOOD PRESSURE: 73 MMHG | HEIGHT: 57 IN | TEMPERATURE: 98 F | SYSTOLIC BLOOD PRESSURE: 152 MMHG | WEIGHT: 145.06 LBS | OXYGEN SATURATION: 99 % | RESPIRATION RATE: 20 BRPM

## 2024-10-11 LAB
ALBUMIN SERPL ELPH-MCNC: 4.1 G/DL — SIGNIFICANT CHANGE UP (ref 3.3–5)
ALP SERPL-CCNC: 74 U/L — SIGNIFICANT CHANGE UP (ref 40–120)
ALT FLD-CCNC: 22 U/L — SIGNIFICANT CHANGE UP (ref 10–45)
ANION GAP SERPL CALC-SCNC: 13 MMOL/L — SIGNIFICANT CHANGE UP (ref 5–17)
APPEARANCE UR: CLEAR — SIGNIFICANT CHANGE UP
APTT BLD: 31.2 SEC — SIGNIFICANT CHANGE UP (ref 24.5–35.6)
AST SERPL-CCNC: 28 U/L — SIGNIFICANT CHANGE UP (ref 10–40)
BACTERIA # UR AUTO: NEGATIVE /HPF — SIGNIFICANT CHANGE UP
BASOPHILS # BLD AUTO: 0.04 K/UL — SIGNIFICANT CHANGE UP (ref 0–0.2)
BASOPHILS NFR BLD AUTO: 0.7 % — SIGNIFICANT CHANGE UP (ref 0–2)
BILIRUB SERPL-MCNC: 0.3 MG/DL — SIGNIFICANT CHANGE UP (ref 0.2–1.2)
BILIRUB UR-MCNC: NEGATIVE — SIGNIFICANT CHANGE UP
BUN SERPL-MCNC: 30 MG/DL — HIGH (ref 7–23)
CALCIUM SERPL-MCNC: 9.8 MG/DL — SIGNIFICANT CHANGE UP (ref 8.4–10.5)
CAST: 1 /LPF — SIGNIFICANT CHANGE UP (ref 0–4)
CHLORIDE SERPL-SCNC: 104 MMOL/L — SIGNIFICANT CHANGE UP (ref 96–108)
CO2 SERPL-SCNC: 24 MMOL/L — SIGNIFICANT CHANGE UP (ref 22–31)
COLOR SPEC: SIGNIFICANT CHANGE UP
CREAT SERPL-MCNC: 0.96 MG/DL — SIGNIFICANT CHANGE UP (ref 0.5–1.3)
DIFF PNL FLD: NEGATIVE — SIGNIFICANT CHANGE UP
EGFR: 57 ML/MIN/1.73M2 — LOW
EOSINOPHIL # BLD AUTO: 0.24 K/UL — SIGNIFICANT CHANGE UP (ref 0–0.5)
EOSINOPHIL NFR BLD AUTO: 4.1 % — SIGNIFICANT CHANGE UP (ref 0–6)
GAS PNL BLDV: SIGNIFICANT CHANGE UP
GLUCOSE SERPL-MCNC: 91 MG/DL — SIGNIFICANT CHANGE UP (ref 70–99)
GLUCOSE UR QL: NEGATIVE MG/DL — SIGNIFICANT CHANGE UP
HCT VFR BLD CALC: 34.6 % — SIGNIFICANT CHANGE UP (ref 34.5–45)
HGB BLD-MCNC: 11.5 G/DL — SIGNIFICANT CHANGE UP (ref 11.5–15.5)
IMM GRANULOCYTES NFR BLD AUTO: 0.3 % — SIGNIFICANT CHANGE UP (ref 0–0.9)
INR BLD: 0.93 RATIO — SIGNIFICANT CHANGE UP (ref 0.85–1.16)
KETONES UR-MCNC: NEGATIVE MG/DL — SIGNIFICANT CHANGE UP
LEUKOCYTE ESTERASE UR-ACNC: ABNORMAL
LIDOCAIN IGE QN: 64 U/L — HIGH (ref 7–60)
LYMPHOCYTES # BLD AUTO: 2.94 K/UL — SIGNIFICANT CHANGE UP (ref 1–3.3)
LYMPHOCYTES # BLD AUTO: 50.3 % — HIGH (ref 13–44)
MCHC RBC-ENTMCNC: 30.8 PG — SIGNIFICANT CHANGE UP (ref 27–34)
MCHC RBC-ENTMCNC: 33.2 GM/DL — SIGNIFICANT CHANGE UP (ref 32–36)
MCV RBC AUTO: 92.8 FL — SIGNIFICANT CHANGE UP (ref 80–100)
MONOCYTES # BLD AUTO: 0.57 K/UL — SIGNIFICANT CHANGE UP (ref 0–0.9)
MONOCYTES NFR BLD AUTO: 9.8 % — SIGNIFICANT CHANGE UP (ref 2–14)
NEUTROPHILS # BLD AUTO: 2.03 K/UL — SIGNIFICANT CHANGE UP (ref 1.8–7.4)
NEUTROPHILS NFR BLD AUTO: 34.8 % — LOW (ref 43–77)
NITRITE UR-MCNC: NEGATIVE — SIGNIFICANT CHANGE UP
NRBC # BLD: 0 /100 WBCS — SIGNIFICANT CHANGE UP (ref 0–0)
PH UR: 7 — SIGNIFICANT CHANGE UP (ref 5–8)
PLATELET # BLD AUTO: 154 K/UL — SIGNIFICANT CHANGE UP (ref 150–400)
POTASSIUM SERPL-MCNC: 3.6 MMOL/L — SIGNIFICANT CHANGE UP (ref 3.5–5.3)
POTASSIUM SERPL-SCNC: 3.6 MMOL/L — SIGNIFICANT CHANGE UP (ref 3.5–5.3)
PROT SERPL-MCNC: 7.2 G/DL — SIGNIFICANT CHANGE UP (ref 6–8.3)
PROT UR-MCNC: NEGATIVE MG/DL — SIGNIFICANT CHANGE UP
PROTHROM AB SERPL-ACNC: 10.7 SEC — SIGNIFICANT CHANGE UP (ref 9.9–13.4)
RBC # BLD: 3.73 M/UL — LOW (ref 3.8–5.2)
RBC # FLD: 13.2 % — SIGNIFICANT CHANGE UP (ref 10.3–14.5)
RBC CASTS # UR COMP ASSIST: 1 /HPF — SIGNIFICANT CHANGE UP (ref 0–4)
SODIUM SERPL-SCNC: 141 MMOL/L — SIGNIFICANT CHANGE UP (ref 135–145)
SP GR SPEC: 1.02 — SIGNIFICANT CHANGE UP (ref 1–1.03)
SQUAMOUS # UR AUTO: 1 /HPF — SIGNIFICANT CHANGE UP (ref 0–5)
UROBILINOGEN FLD QL: 1 MG/DL — SIGNIFICANT CHANGE UP (ref 0.2–1)
WBC # BLD: 5.84 K/UL — SIGNIFICANT CHANGE UP (ref 3.8–10.5)
WBC # FLD AUTO: 5.84 K/UL — SIGNIFICANT CHANGE UP (ref 3.8–10.5)
WBC UR QL: 21 /HPF — HIGH (ref 0–5)

## 2024-10-11 PROCEDURE — 72132 CT LUMBAR SPINE W/DYE: CPT | Mod: 26,MC

## 2024-10-11 PROCEDURE — 83605 ASSAY OF LACTIC ACID: CPT

## 2024-10-11 PROCEDURE — 71260 CT THORAX DX C+: CPT | Mod: 26,MC

## 2024-10-11 PROCEDURE — 74177 CT ABD & PELVIS W/CONTRAST: CPT | Mod: 26,MC

## 2024-10-11 PROCEDURE — 81001 URINALYSIS AUTO W/SCOPE: CPT

## 2024-10-11 PROCEDURE — 82330 ASSAY OF CALCIUM: CPT

## 2024-10-11 PROCEDURE — 71260 CT THORAX DX C+: CPT | Mod: MC

## 2024-10-11 PROCEDURE — 84132 ASSAY OF SERUM POTASSIUM: CPT

## 2024-10-11 PROCEDURE — 99284 EMERGENCY DEPT VISIT MOD MDM: CPT | Mod: 25

## 2024-10-11 PROCEDURE — 85018 HEMOGLOBIN: CPT

## 2024-10-11 PROCEDURE — 36415 COLL VENOUS BLD VENIPUNCTURE: CPT

## 2024-10-11 PROCEDURE — 85610 PROTHROMBIN TIME: CPT

## 2024-10-11 PROCEDURE — 85730 THROMBOPLASTIN TIME PARTIAL: CPT

## 2024-10-11 PROCEDURE — 82803 BLOOD GASES ANY COMBINATION: CPT

## 2024-10-11 PROCEDURE — 96374 THER/PROPH/DIAG INJ IV PUSH: CPT | Mod: XU

## 2024-10-11 PROCEDURE — 70450 CT HEAD/BRAIN W/O DYE: CPT | Mod: 26,MC

## 2024-10-11 PROCEDURE — 99285 EMERGENCY DEPT VISIT HI MDM: CPT

## 2024-10-11 PROCEDURE — 85014 HEMATOCRIT: CPT

## 2024-10-11 PROCEDURE — 72125 CT NECK SPINE W/O DYE: CPT | Mod: 26,MC

## 2024-10-11 PROCEDURE — 74177 CT ABD & PELVIS W/CONTRAST: CPT | Mod: MC

## 2024-10-11 PROCEDURE — 82435 ASSAY OF BLOOD CHLORIDE: CPT

## 2024-10-11 PROCEDURE — 84295 ASSAY OF SERUM SODIUM: CPT

## 2024-10-11 PROCEDURE — 72129 CT CHEST SPINE W/DYE: CPT | Mod: 26,MC

## 2024-10-11 PROCEDURE — 70450 CT HEAD/BRAIN W/O DYE: CPT | Mod: MC

## 2024-10-11 PROCEDURE — 80053 COMPREHEN METABOLIC PANEL: CPT

## 2024-10-11 PROCEDURE — 72125 CT NECK SPINE W/O DYE: CPT | Mod: MC

## 2024-10-11 PROCEDURE — 85025 COMPLETE CBC W/AUTO DIFF WBC: CPT

## 2024-10-11 PROCEDURE — 82947 ASSAY GLUCOSE BLOOD QUANT: CPT

## 2024-10-11 PROCEDURE — 83690 ASSAY OF LIPASE: CPT

## 2024-10-11 RX ORDER — LIDOCAINE 50 MG/G
1 CREAM TOPICAL ONCE
Refills: 0 | Status: COMPLETED | OUTPATIENT
Start: 2024-10-11 | End: 2024-10-11

## 2024-10-11 RX ORDER — ACETAMINOPHEN 325 MG
1000 TABLET ORAL ONCE
Refills: 0 | Status: COMPLETED | OUTPATIENT
Start: 2024-10-11 | End: 2024-10-11

## 2024-10-11 RX ADMIN — LIDOCAINE 1 PATCH: 50 CREAM TOPICAL at 20:06

## 2024-10-11 RX ADMIN — Medication 400 MILLIGRAM(S): at 20:05
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
## 2024-10-11 NOTE — ED PROVIDER NOTE - ATTENDING APP SHARED VISIT CONTRIBUTION OF CARE
Attending MD Park: I personally made/approved the management plan and take responsibility for the patient management.
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
## 2024-10-11 NOTE — ED PROVIDER NOTE - PATIENT PORTAL LINK FT
You can access the FollowMyHealth Patient Portal offered by [facility] by registering at the following website: [email or web address]. By joining [facility] [facility]’s FollowMyHealth portal, you will also be able to view your health information using other applications (apps) compatible with our system.

## 2024-10-11 NOTE — ED ADULT NURSE NOTE - NSFALLRISKINTERV_ED_ALL_ED
Assistance OOB with selected safe patient handling equipment if applicable/Assistance with ambulation/Communicate fall risk and risk factors to all staff, patient, and family/Monitor gait and stability/Provide patient with walking aids/Provide visual cue: yellow wristband, yellow gown, etc/Reinforce activity limits and safety measures with patient and family/Bed in lowest position, wheels locked, appropriate side rails in place/Call bell, personal items and telephone in reach/Instruct patient to call for assistance before getting out of bed/chair/stretcher/Physically safe environment - no spills, clutter or unnecessary equipment/Purposeful Proactive Rounding/Room/bathroom lighting operational, light cord in reach

## 2024-10-11 NOTE — ED PROVIDER NOTE - PROGRESS NOTE DETAILS
NAD. Neuro- intact. +Ambulatory to bathroom with steady gait. Awaiting for CT. ED spoke with patient's family member regarding CT scan results.  Family is aware of cancer.  Patient will be discharged with strict return precautions

## 2024-10-11 NOTE — ED PROVIDER NOTE - PHYSICAL EXAMINATION
NAD. VSS. Afebrile. No facial or scalp tender. Neck supple. Lungs wheezing bilaterally. No C/T tender. +Generalized low lumbar tender. NO chest wall, rib, or cva tender. +Generalized ABD tender. NO hip tender. No focal neuro deficit.
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
## 2024-10-12 VITALS
DIASTOLIC BLOOD PRESSURE: 71 MMHG | OXYGEN SATURATION: 97 % | HEART RATE: 72 BPM | RESPIRATION RATE: 18 BRPM | SYSTOLIC BLOOD PRESSURE: 186 MMHG | TEMPERATURE: 98 F

## 2024-10-12 PROBLEM — I10 ESSENTIAL (PRIMARY) HYPERTENSION: Chronic | Status: ACTIVE | Noted: 2022-01-02
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
